# Patient Record
Sex: FEMALE | Race: WHITE | Employment: OTHER | URBAN - NONMETROPOLITAN AREA
[De-identification: names, ages, dates, MRNs, and addresses within clinical notes are randomized per-mention and may not be internally consistent; named-entity substitution may affect disease eponyms.]

---

## 2020-11-09 ENCOUNTER — HOSPITAL ENCOUNTER (INPATIENT)
Age: 85
LOS: 2 days | Discharge: HOME HEALTH | DRG: 064 | End: 2020-11-11
Attending: HOSPITALIST | Admitting: FAMILY MEDICINE
Payer: MEDICARE

## 2020-11-09 PROBLEM — I69.319 CVA, OLD, COGNITIVE DEFICITS: Status: ACTIVE | Noted: 2020-11-09

## 2020-11-09 PROBLEM — F02.80 ALZHEIMER'S DISEASE (HCC): Status: ACTIVE | Noted: 2020-11-09

## 2020-11-09 PROBLEM — Z79.01 CHRONIC ANTICOAGULATION: Status: ACTIVE | Noted: 2020-11-09

## 2020-11-09 PROBLEM — I62.9 INTRACRANIAL HEMORRHAGE (HCC): Status: ACTIVE | Noted: 2020-11-09

## 2020-11-09 PROBLEM — G30.9 ALZHEIMER'S DISEASE (HCC): Status: ACTIVE | Noted: 2020-11-09

## 2020-11-09 PROBLEM — R29.90 STROKE-LIKE SYMPTOMS: Status: ACTIVE | Noted: 2020-11-09

## 2020-11-09 LAB
ANION GAP SERPL CALCULATED.3IONS-SCNC: 12 MMOL/L (ref 7–19)
BACTERIA: NEGATIVE /HPF
BASOPHILS ABSOLUTE: 0.1 K/UL (ref 0–0.2)
BASOPHILS RELATIVE PERCENT: 0.6 % (ref 0–1)
BILIRUBIN URINE: NEGATIVE
BLOOD, URINE: ABNORMAL
BUN BLDV-MCNC: 17 MG/DL (ref 8–23)
CALCIUM SERPL-MCNC: 9.5 MG/DL (ref 8.8–10.2)
CHLORIDE BLD-SCNC: 100 MMOL/L (ref 98–111)
CLARITY: CLEAR
CO2: 25 MMOL/L (ref 22–29)
COLOR: YELLOW
CREAT SERPL-MCNC: 1.4 MG/DL (ref 0.5–0.9)
CRYSTALS, UA: ABNORMAL /HPF
EOSINOPHILS ABSOLUTE: 0.1 K/UL (ref 0–0.6)
EOSINOPHILS RELATIVE PERCENT: 0.4 % (ref 0–5)
EPITHELIAL CELLS, UA: 3 /HPF (ref 0–5)
GFR AFRICAN AMERICAN: 43
GFR NON-AFRICAN AMERICAN: 36
GLUCOSE BLD-MCNC: 114 MG/DL (ref 74–109)
GLUCOSE URINE: NEGATIVE MG/DL
HCT VFR BLD CALC: 40 % (ref 37–47)
HEMOGLOBIN: 12.9 G/DL (ref 12–16)
HYALINE CASTS: 1 /HPF (ref 0–8)
IMMATURE GRANULOCYTES #: 0.1 K/UL
KETONES, URINE: ABNORMAL MG/DL
LEUKOCYTE ESTERASE, URINE: NEGATIVE
LYMPHOCYTES ABSOLUTE: 1.8 K/UL (ref 1.1–4.5)
LYMPHOCYTES RELATIVE PERCENT: 15.9 % (ref 20–40)
MAGNESIUM: 2 MG/DL (ref 1.6–2.4)
MCH RBC QN AUTO: 27.2 PG (ref 27–31)
MCHC RBC AUTO-ENTMCNC: 32.3 G/DL (ref 33–37)
MCV RBC AUTO: 84.4 FL (ref 81–99)
MONOCYTES ABSOLUTE: 1 K/UL (ref 0–0.9)
MONOCYTES RELATIVE PERCENT: 8.7 % (ref 0–10)
NEUTROPHILS ABSOLUTE: 8.5 K/UL (ref 1.5–7.5)
NEUTROPHILS RELATIVE PERCENT: 73.9 % (ref 50–65)
NITRITE, URINE: NEGATIVE
PDW BLD-RTO: 14.4 % (ref 11.5–14.5)
PH UA: 5 (ref 5–8)
PHOSPHORUS: 2.1 MG/DL (ref 2.5–4.5)
PLATELET # BLD: 225 K/UL (ref 130–400)
PMV BLD AUTO: 11.7 FL (ref 9.4–12.3)
POTASSIUM SERPL-SCNC: 3.4 MMOL/L (ref 3.5–5)
PROTEIN UA: NEGATIVE MG/DL
RBC # BLD: 4.74 M/UL (ref 4.2–5.4)
RBC UA: 13 /HPF (ref 0–4)
SODIUM BLD-SCNC: 137 MMOL/L (ref 136–145)
SPECIFIC GRAVITY UA: 1.02 (ref 1–1.03)
TSH REFLEX FT4: 2.57 UIU/ML (ref 0.35–5.5)
UROBILINOGEN, URINE: 1 E.U./DL
VITAMIN B-12: 364 PG/ML (ref 211–946)
WBC # BLD: 11.5 K/UL (ref 4.8–10.8)
WBC UA: 1 /HPF (ref 0–5)

## 2020-11-09 PROCEDURE — 6360000002 HC RX W HCPCS: Performed by: HOSPITALIST

## 2020-11-09 PROCEDURE — 2580000003 HC RX 258: Performed by: HOSPITALIST

## 2020-11-09 PROCEDURE — 83735 ASSAY OF MAGNESIUM: CPT

## 2020-11-09 PROCEDURE — 1210000000 HC MED SURG R&B

## 2020-11-09 PROCEDURE — 80048 BASIC METABOLIC PNL TOTAL CA: CPT

## 2020-11-09 PROCEDURE — 85025 COMPLETE CBC W/AUTO DIFF WBC: CPT

## 2020-11-09 PROCEDURE — 84100 ASSAY OF PHOSPHORUS: CPT

## 2020-11-09 PROCEDURE — 99221 1ST HOSP IP/OBS SF/LOW 40: CPT | Performed by: NEUROLOGICAL SURGERY

## 2020-11-09 PROCEDURE — 82607 VITAMIN B-12: CPT

## 2020-11-09 PROCEDURE — 6370000000 HC RX 637 (ALT 250 FOR IP): Performed by: HOSPITALIST

## 2020-11-09 PROCEDURE — 36415 COLL VENOUS BLD VENIPUNCTURE: CPT

## 2020-11-09 PROCEDURE — 84443 ASSAY THYROID STIM HORMONE: CPT

## 2020-11-09 PROCEDURE — 2500000003 HC RX 250 WO HCPCS: Performed by: HOSPITALIST

## 2020-11-09 PROCEDURE — 81001 URINALYSIS AUTO W/SCOPE: CPT

## 2020-11-09 RX ORDER — ACETAMINOPHEN 325 MG/1
650 TABLET ORAL EVERY 4 HOURS PRN
Status: DISCONTINUED | OUTPATIENT
Start: 2020-11-09 | End: 2020-11-11 | Stop reason: HOSPADM

## 2020-11-09 RX ORDER — ATORVASTATIN CALCIUM 40 MG/1
40 TABLET, FILM COATED ORAL NIGHTLY
Status: DISCONTINUED | OUTPATIENT
Start: 2020-11-09 | End: 2020-11-11 | Stop reason: HOSPADM

## 2020-11-09 RX ORDER — ACETAMINOPHEN 650 MG/1
650 SUPPOSITORY RECTAL EVERY 4 HOURS PRN
Status: DISCONTINUED | OUTPATIENT
Start: 2020-11-09 | End: 2020-11-11 | Stop reason: HOSPADM

## 2020-11-09 RX ORDER — PANTOPRAZOLE SODIUM 40 MG/1
40 TABLET, DELAYED RELEASE ORAL
Status: DISCONTINUED | OUTPATIENT
Start: 2020-11-10 | End: 2020-11-11 | Stop reason: HOSPADM

## 2020-11-09 RX ORDER — PHENOL 1.4 %
1 AEROSOL, SPRAY (ML) MUCOUS MEMBRANE 2 TIMES DAILY
Status: DISCONTINUED | OUTPATIENT
Start: 2020-11-09 | End: 2020-11-11 | Stop reason: HOSPADM

## 2020-11-09 RX ORDER — ONDANSETRON 2 MG/ML
4 INJECTION INTRAMUSCULAR; INTRAVENOUS EVERY 6 HOURS PRN
Status: DISCONTINUED | OUTPATIENT
Start: 2020-11-09 | End: 2020-11-11 | Stop reason: HOSPADM

## 2020-11-09 RX ORDER — SODIUM CHLORIDE 0.9 % (FLUSH) 0.9 %
10 SYRINGE (ML) INJECTION PRN
Status: DISCONTINUED | OUTPATIENT
Start: 2020-11-09 | End: 2020-11-11 | Stop reason: HOSPADM

## 2020-11-09 RX ORDER — SODIUM CHLORIDE 0.9 % (FLUSH) 0.9 %
10 SYRINGE (ML) INJECTION EVERY 12 HOURS SCHEDULED
Status: DISCONTINUED | OUTPATIENT
Start: 2020-11-09 | End: 2020-11-11 | Stop reason: HOSPADM

## 2020-11-09 RX ORDER — POLYETHYLENE GLYCOL 3350 17 G/17G
17 POWDER, FOR SOLUTION ORAL DAILY PRN
Status: DISCONTINUED | OUTPATIENT
Start: 2020-11-09 | End: 2020-11-11 | Stop reason: HOSPADM

## 2020-11-09 RX ORDER — PROMETHAZINE HYDROCHLORIDE 12.5 MG/1
12.5 TABLET ORAL EVERY 6 HOURS PRN
Status: DISCONTINUED | OUTPATIENT
Start: 2020-11-09 | End: 2020-11-11 | Stop reason: HOSPADM

## 2020-11-09 RX ORDER — LEVETIRACETAM 500 MG/1
500 TABLET ORAL 2 TIMES DAILY
Status: DISCONTINUED | OUTPATIENT
Start: 2020-11-09 | End: 2020-11-11 | Stop reason: HOSPADM

## 2020-11-09 RX ORDER — PROPRANOLOL HYDROCHLORIDE 20 MG/1
20 TABLET ORAL 2 TIMES DAILY
Status: DISCONTINUED | OUTPATIENT
Start: 2020-11-09 | End: 2020-11-11 | Stop reason: HOSPADM

## 2020-11-09 RX ORDER — POTASSIUM CHLORIDE 20 MEQ/1
20 TABLET, EXTENDED RELEASE ORAL 2 TIMES DAILY
Status: DISCONTINUED | OUTPATIENT
Start: 2020-11-09 | End: 2020-11-11 | Stop reason: HOSPADM

## 2020-11-09 RX ORDER — SODIUM CHLORIDE AND POTASSIUM CHLORIDE .9; .15 G/100ML; G/100ML
SOLUTION INTRAVENOUS CONTINUOUS
Status: DISPENSED | OUTPATIENT
Start: 2020-11-09 | End: 2020-11-10

## 2020-11-09 RX ADMIN — ACETAMINOPHEN 650 MG: 325 TABLET ORAL at 22:32

## 2020-11-09 RX ADMIN — CEFTRIAXONE 1 G: 1 INJECTION, POWDER, FOR SOLUTION INTRAMUSCULAR; INTRAVENOUS at 22:33

## 2020-11-09 RX ADMIN — Medication 600 MG: at 22:33

## 2020-11-09 RX ADMIN — POTASSIUM CHLORIDE 20 MEQ: 20 TABLET, EXTENDED RELEASE ORAL at 22:37

## 2020-11-09 RX ADMIN — POTASSIUM CHLORIDE AND SODIUM CHLORIDE: 900; 150 INJECTION, SOLUTION INTRAVENOUS at 22:52

## 2020-11-09 RX ADMIN — PROPRANOLOL HYDROCHLORIDE 20 MG: 20 TABLET ORAL at 22:32

## 2020-11-09 RX ADMIN — POTASSIUM PHOSPHATE, MONOBASIC AND POTASSIUM PHOSPHATE, DIBASIC 30 MMOL: 224; 236 INJECTION, SOLUTION, CONCENTRATE INTRAVENOUS at 23:57

## 2020-11-09 RX ADMIN — LEVETIRACETAM 500 MG: 500 TABLET ORAL at 22:33

## 2020-11-09 RX ADMIN — ATORVASTATIN CALCIUM 40 MG: 40 TABLET, FILM COATED ORAL at 22:33

## 2020-11-09 ASSESSMENT — PAIN SCALES - GENERAL
PAINLEVEL_OUTOF10: 2
PAINLEVEL_OUTOF10: 0

## 2020-11-10 ENCOUNTER — APPOINTMENT (OUTPATIENT)
Dept: MRI IMAGING | Age: 85
DRG: 064 | End: 2020-11-10
Attending: HOSPITALIST
Payer: MEDICARE

## 2020-11-10 ENCOUNTER — APPOINTMENT (OUTPATIENT)
Dept: CT IMAGING | Age: 85
DRG: 064 | End: 2020-11-10
Attending: HOSPITALIST
Payer: MEDICARE

## 2020-11-10 LAB
ANION GAP SERPL CALCULATED.3IONS-SCNC: 10 MMOL/L (ref 7–19)
BASOPHILS ABSOLUTE: 0.1 K/UL (ref 0–0.2)
BASOPHILS RELATIVE PERCENT: 0.8 % (ref 0–1)
BUN BLDV-MCNC: 17 MG/DL (ref 8–23)
CALCIUM SERPL-MCNC: 9.9 MG/DL (ref 8.8–10.2)
CHLORIDE BLD-SCNC: 99 MMOL/L (ref 98–111)
CHOLESTEROL, TOTAL: 167 MG/DL (ref 160–199)
CO2: 26 MMOL/L (ref 22–29)
CREAT SERPL-MCNC: 1.5 MG/DL (ref 0.5–0.9)
EOSINOPHILS ABSOLUTE: 0.1 K/UL (ref 0–0.6)
EOSINOPHILS RELATIVE PERCENT: 1.1 % (ref 0–5)
GFR AFRICAN AMERICAN: 40
GFR NON-AFRICAN AMERICAN: 33
GLUCOSE BLD-MCNC: 102 MG/DL (ref 74–109)
HBA1C MFR BLD: 5.8 % (ref 4–6)
HCT VFR BLD CALC: 38.3 % (ref 37–47)
HDLC SERPL-MCNC: 42 MG/DL (ref 65–121)
HEMOGLOBIN: 12.3 G/DL (ref 12–16)
IMMATURE GRANULOCYTES #: 0 K/UL
LDL CHOLESTEROL CALCULATED: 105 MG/DL
LV EF: 58 %
LVEF MODALITY: NORMAL
LYMPHOCYTES ABSOLUTE: 2 K/UL (ref 1.1–4.5)
LYMPHOCYTES RELATIVE PERCENT: 19 % (ref 20–40)
MCH RBC QN AUTO: 27 PG (ref 27–31)
MCHC RBC AUTO-ENTMCNC: 32.1 G/DL (ref 33–37)
MCV RBC AUTO: 84.2 FL (ref 81–99)
MONOCYTES ABSOLUTE: 1.1 K/UL (ref 0–0.9)
MONOCYTES RELATIVE PERCENT: 10.7 % (ref 0–10)
NEUTROPHILS ABSOLUTE: 7.2 K/UL (ref 1.5–7.5)
NEUTROPHILS RELATIVE PERCENT: 68 % (ref 50–65)
PDW BLD-RTO: 14.5 % (ref 11.5–14.5)
PHOSPHORUS: 5.1 MG/DL (ref 2.5–4.5)
PLATELET # BLD: 232 K/UL (ref 130–400)
PMV BLD AUTO: 11.5 FL (ref 9.4–12.3)
POTASSIUM SERPL-SCNC: 4.6 MMOL/L (ref 3.5–5)
RBC # BLD: 4.55 M/UL (ref 4.2–5.4)
SODIUM BLD-SCNC: 135 MMOL/L (ref 136–145)
TRIGL SERPL-MCNC: 101 MG/DL (ref 0–149)
WBC # BLD: 10.5 K/UL (ref 4.8–10.8)

## 2020-11-10 PROCEDURE — 70553 MRI BRAIN STEM W/O & W/DYE: CPT

## 2020-11-10 PROCEDURE — 84100 ASSAY OF PHOSPHORUS: CPT

## 2020-11-10 PROCEDURE — A9577 INJ MULTIHANCE: HCPCS | Performed by: EMERGENCY MEDICINE

## 2020-11-10 PROCEDURE — 99223 1ST HOSP IP/OBS HIGH 75: CPT | Performed by: PSYCHIATRY & NEUROLOGY

## 2020-11-10 PROCEDURE — 85025 COMPLETE CBC W/AUTO DIFF WBC: CPT

## 2020-11-10 PROCEDURE — 97535 SELF CARE MNGMENT TRAINING: CPT

## 2020-11-10 PROCEDURE — 95816 EEG AWAKE AND DROWSY: CPT

## 2020-11-10 PROCEDURE — 2580000003 HC RX 258: Performed by: HOSPITALIST

## 2020-11-10 PROCEDURE — 6360000002 HC RX W HCPCS: Performed by: HOSPITALIST

## 2020-11-10 PROCEDURE — C8929 TTE W OR WO FOL WCON,DOPPLER: HCPCS

## 2020-11-10 PROCEDURE — 6360000004 HC RX CONTRAST MEDICATION: Performed by: EMERGENCY MEDICINE

## 2020-11-10 PROCEDURE — 97116 GAIT TRAINING THERAPY: CPT

## 2020-11-10 PROCEDURE — 92523 SPEECH SOUND LANG COMPREHEN: CPT

## 2020-11-10 PROCEDURE — 99232 SBSQ HOSP IP/OBS MODERATE 35: CPT | Performed by: NEUROLOGICAL SURGERY

## 2020-11-10 PROCEDURE — 83036 HEMOGLOBIN GLYCOSYLATED A1C: CPT

## 2020-11-10 PROCEDURE — 97161 PT EVAL LOW COMPLEX 20 MIN: CPT

## 2020-11-10 PROCEDURE — 36415 COLL VENOUS BLD VENIPUNCTURE: CPT

## 2020-11-10 PROCEDURE — 70544 MR ANGIOGRAPHY HEAD W/O DYE: CPT

## 2020-11-10 PROCEDURE — 80048 BASIC METABOLIC PNL TOTAL CA: CPT

## 2020-11-10 PROCEDURE — 6370000000 HC RX 637 (ALT 250 FOR IP): Performed by: HOSPITALIST

## 2020-11-10 PROCEDURE — 92610 EVALUATE SWALLOWING FUNCTION: CPT

## 2020-11-10 PROCEDURE — 1210000000 HC MED SURG R&B

## 2020-11-10 PROCEDURE — 93880 EXTRACRANIAL BILAT STUDY: CPT

## 2020-11-10 PROCEDURE — 80061 LIPID PANEL: CPT

## 2020-11-10 PROCEDURE — 70450 CT HEAD/BRAIN W/O DYE: CPT

## 2020-11-10 PROCEDURE — 97166 OT EVAL MOD COMPLEX 45 MIN: CPT

## 2020-11-10 PROCEDURE — 6360000004 HC RX CONTRAST MEDICATION: Performed by: NURSE PRACTITIONER

## 2020-11-10 RX ADMIN — POTASSIUM CHLORIDE 20 MEQ: 20 TABLET, EXTENDED RELEASE ORAL at 12:49

## 2020-11-10 RX ADMIN — PROPRANOLOL HYDROCHLORIDE 20 MG: 20 TABLET ORAL at 12:50

## 2020-11-10 RX ADMIN — PROPRANOLOL HYDROCHLORIDE 20 MG: 20 TABLET ORAL at 20:43

## 2020-11-10 RX ADMIN — PERFLUTREN 1.65 MG: 6.52 INJECTION, SUSPENSION INTRAVENOUS at 08:10

## 2020-11-10 RX ADMIN — POTASSIUM CHLORIDE 20 MEQ: 20 TABLET, EXTENDED RELEASE ORAL at 20:43

## 2020-11-10 RX ADMIN — Medication 600 MG: at 20:43

## 2020-11-10 RX ADMIN — Medication 600 MG: at 12:48

## 2020-11-10 RX ADMIN — GADOBENATE DIMEGLUMINE 14 ML: 529 INJECTION, SOLUTION INTRAVENOUS at 11:38

## 2020-11-10 RX ADMIN — PANTOPRAZOLE SODIUM 40 MG: 40 TABLET, DELAYED RELEASE ORAL at 05:24

## 2020-11-10 RX ADMIN — CEFTRIAXONE 1 G: 1 INJECTION, POWDER, FOR SOLUTION INTRAMUSCULAR; INTRAVENOUS at 20:39

## 2020-11-10 RX ADMIN — ATORVASTATIN CALCIUM 40 MG: 40 TABLET, FILM COATED ORAL at 20:43

## 2020-11-10 RX ADMIN — LEVETIRACETAM 500 MG: 500 TABLET ORAL at 20:43

## 2020-11-10 RX ADMIN — PANTOPRAZOLE SODIUM 40 MG: 40 TABLET, DELAYED RELEASE ORAL at 17:26

## 2020-11-10 RX ADMIN — LEVETIRACETAM 500 MG: 500 TABLET ORAL at 12:48

## 2020-11-10 NOTE — CONSULTS
[facility] Neurology Consult  ? ? Patient: Valarie Patton  MR#: [id number]  Account Number: [de-identified]   Room: 46/36-18   YOB: 1933  Date of Progress Note: 11/10/2020  Time of Note 12:35 PM  Attending Physician: Edgard Mcdonald MD  Consulting Physician: Simon Andrade M.D.  ?  ? CHIEF COMPLAINT: Aphasia, possible new onset seizure and abnormal CT of the head. ? HISTORY OF PRESENT ILLNESS:   This 80 y.o. female who presents with aphasia/confusion, possible new onset seizure and abnormal CT scan of the head. According to the records, she was taken to an outside ED because she became aphasic. CT scan showed a hypodensity in the left parietal region as well as an old one in the left occipital region. Additionally, there is a small hyper dense lesion in the right frontal cortex suspicious for possible vascular lesion or bleed. Patient supposedly is on aspirin and Xarelto at home. Reportedly had some seizure-like activity at 1 point and was placed on Keppra. She is now stable and on the regular floor but still somewhat aphasic. I do not know her baseline. No family is in the room. CT scan reviewed. Today's MRI of the head reviewed. There is a subacute stroke in the left posterior MCA distribution. Possible tiny hemorrhage in the right frontal cortex. MRA results uncertain as there appears to be a good deal of artifact. Carotid Dopplers okay as is echocardiogram.  REVIEW OF SYSTEMS:  Constitutional - No fever or chills. No diaphoresis or significant fatigue. HENT - No tinnitus or significant hearing loss. Eyes - no sudden vision change or eye pain  Respiratory - no significant shortness of breath or cough  Cardiovascular - no chest pain No palpitations or significant leg swelling  Gastrointestinal - no abdominal swelling or pain. Genitourinary - No difficulty urinating, dysuria  Musculoskeletal - no back pain or myalgia.   Skin - no color change or rash  Neurologic - No headaches or abnormal movements. Hematologic - no easy bruising or excessive bleeding. Psychiatric - no severe anxiety or nervousness. All other review of systems are negative. ?   Past Medical History:      Diagnosis Date    Arthritis     [facility] [address] [address]     Fatigue     Fracture of distal end of radius     Hyperlipidemia     Hypertension     Osteoarthritis of knee     Osteoporosis     [facility]oxysmal A-fib ([facility] [address]     Stroke (cerebrum) ([facility] [address] 06/2015    left posterior cerebral artery CVA    Transient blindness     Transient cerebral ischemia      Past Surgical History:      Procedure Laterality Date    BACK SURGERY      CHOLECYSTECTOMY      PARTIAL HYSTERECTOMY      WRIST FRACTURE SURGERY      WRIST FRACTURE SURGERY Left     ORIF distal radius fracture     Medications in Hospital:     Current Facility-Administered Medications:     levETIRAcetam (KEPPRA) tablet 500 mg, 500 mg, Oral, BID, Michele Watts MD, 500 mg at 11/09/20 2233    sodium chloride flush 0.9 % injection 10 mL, 10 mL, Intravenous, 2 times per day, Michele Watts MD    sodium chloride flush 0.9 % injection 10 mL, 10 mL, Intravenous, PRN, Michele Watts MD    acetaminophen (TYLENOL) tablet 650 mg, 650 mg, Oral, Q4H PRN, 650 mg at 11/09/20 2232 **OR** acetaminophen (TYLENOL) suppository 650 mg, 650 mg, Rectal, Q4H PRN, Michele Watts MD    polyethylene glycol (GLYCOLAX) packet 17 g, 17 g, Oral, Daily PRN, Michele Watts MD    promethazine (PHENERGAN) tablet 12.5 mg, 12.5 mg, Oral, Q6H PRN **OR** ondansetron (ZOFRAN) injection 4 mg, 4 mg, Intravenous, Q6H PRN, Michele Watts MD    enoxaparin (LOVENOX) injection 40 mg, 40 mg, Subcutaneous, Daily, Michele Watts MD    calcium carbonate tablet 600 mg, 1 tablet, Oral, BID, Michele Watts MD, 600 mg at 11/09/20 2233    pantoprazole (PROTONIX) tablet 40 mg, 40 mg, Oral, BID AC, Michele Watts MD, 40 mg at 11/10/20 0524    potassium chloride (KLOR-CON M) extended release tablet 20 mEq, 20 mEq, Oral, BID, Michele Watts MD, 20 mEq at 11/09/20 2237    propranolol (INDERAL) tablet 20 mg, 20 mg, Oral, BID, Mihcele Watts MD, 20 mg at 11/09/20 2232    atorvastatin (LIPITOR) tablet 40 mg, 40 mg, Oral, Nightly, Michele Watts MD, 40 mg at 11/09/20 2233    cefTRIAXone (ROCEPHIN) 1 g in sterile water 10 mL IV syringe, 1 g, Intravenous, Q24H, Michele Watts MD, 1 g at 11/09/20 2233    0.9% NaCl with KCl 20 mEq infusion, , Intravenous, Continuous, Michele Watts MD, Last Rate: 75 mL/hr at 11/09/20 2252  Allergies: Alferd Edu [aspirin]  Social History:   TOBACCO:  reports that she has never smoked. She has never used smokeless tobacco.  ETOH:  reports no history of alcohol use. Family History:       Problem Relation Age of Onset    Stroke Maternal Grandmother      ? ? Physical Exam:    Vitals: /71   Pulse 63   Temp 97.5 °F (36.4 °C) (Temporal)   Resp 18   SpO2 94%   Constitutional - well developed, well nourished. Eyes - conjunctiva normal. Pupils react to light  Ear, nose, throat -hearing intact to finger rub No scars, masses, or lesions over external nose or ears, no atrophy of tongue  Neck-symmetric, no masses noted, no jugular vein distension  Respiration- chest wall appears symmetric, good expansion,   normal effort without use of accessory muscles  Musculoskeletal - no significant wasting of muscles noted, no bony deformities  Extremities-no clubbing, cyanosis or edema  Skin - warm, dry, and intact. No rash, erythema, or pallor. Psychiatric - mood, affect, and behavior appear normal.   Neurological exam  Awake, alert, fluent oriented to hospital.  She has word finding difficulties and cannot come up with the name of it. She had trouble naming object parts. Cannot follow complex commands. Had some difficulty with simple ones.   Cranial Nerve Exam   CN II- Visual fields grossly unremarkable but difficult to assess due to aphasia  CN III, IV,VI-EOMI, No nystagmus, conjugate eye movements, no ptosis  CN V-sensation intact to LT over on hold. Defer to neurosurgery.       Frances Fitzpatrick MD  Board Certified in Neurology

## 2020-11-10 NOTE — PROGRESS NOTES
[facility]  Consult Note    INTERVAL HISTORY:  No new events overnight. Repeat CT head this AM reveals improvement in the right frontal hemorrhage. MRI brain consistent with small hemorrhage. No clear vascular lesion or tumor on MRI or MRA - some artifact noted. Subacute/acute stroke noted in left parietal area. Seen by neurology team.      Patient is pleasant. She denies any new complains. No HA. Somewhat confused but overall appropriate. CHIEF COMPLAINT:  AMS, difficult speaking    HISTORY OF PRESENT ILLNESS:      The patient is a 80 y.o. female with Hx of previous stroke on ASA and Xarelto who presented to [facility] ED with an acute onset of AMS and aphasia. Family stated patient was having trouble speaking and appeared confused. CT head revealed an old left occipital stroke but a new hyperdensity over right frontal lobe concerning for either hemorrhage or vascular lesion. Just prior to leaving patient was noted to have episode of stiffing and involuntary movements suggestive of a seizure. Patient was stabilized, given keppra 1000 mg and transferred here.         Past Medical History:   Diagnosis Date    Arthritis     Atrial fibrillation ([address]     Fatigue     Fracture of distal end of radius     Hyperlipidemia     Hypertension     Osteoarthritis of knee     Osteoporosis     Paroxysmal A-fib ([address]     Stroke (cerebrum) ([address] 06/2015    left posterior cerebral artery CVA    Transient blindness     Transient cerebral ischemia        Past Surgical History:   Procedure Laterality Date    BACK SURGERY      CHOLECYSTECTOMY      PARTIAL HYSTERECTOMY      WRIST FRACTURE SURGERY      WRIST FRACTURE SURGERY Left     ORIF distal radius fracture        Medications    Current Facility-Administered Medications:     perflutren lipid microspheres (DEFINITY) injection 1.65 mg, 1.5 mL, Intravenous, ONCE PRN, LEXY Sibley, 1.65 mg at 11/10/20 0810    [START ON 11/11/2020] enoxaparin (LOVENOX) injection 30 mg, 30 mg, Subcutaneous, Daily, Michele Watts MD    levETIRAcetam (KEPPRA) tablet 500 mg, 500 mg, Oral, BID, Michele Watts MD, 500 mg at 11/10/20 1248    sodium chloride flush 0.9 % injection 10 mL, 10 mL, Intravenous, 2 times per day, Michele Watts MD    sodium chloride flush 0.9 % injection 10 mL, 10 mL, Intravenous, PRN, Michele Watts MD    acetaminophen (TYLENOL) tablet 650 mg, 650 mg, Oral, Q4H PRN, 650 mg at 11/09/20 2232 **OR** acetaminophen (TYLENOL) suppository 650 mg, 650 mg, Rectal, Q4H PRN, Michele Watts MD    polyethylene glycol (GLYCOLAX) packet 17 g, 17 g, Oral, Daily PRN, Michele Watts MD    promethazine (PHENERGAN) tablet 12.5 mg, 12.5 mg, Oral, Q6H PRN **OR** ondansetron (ZOFRAN) injection 4 mg, 4 mg, Intravenous, Q6H PRN, Michele Watts MD    calcium carbonate tablet 600 mg, 1 tablet, Oral, BID, Michele Watts MD, 600 mg at 11/10/20 1248    pantoprazole (PROTONIX) tablet 40 mg, 40 mg, Oral, BID AC, Michele Watts MD, 40 mg at 11/10/20 0524    potassium chloride (KLOR-CON M) extended release tablet 20 mEq, 20 mEq, Oral, BID, Michele Watts MD, 20 mEq at 11/10/20 1249    propranolol (INDERAL) tablet 20 mg, 20 mg, Oral, BID, Michele Watts MD, 20 mg at 11/10/20 1250    atorvastatin (LIPITOR) tablet 40 mg, 40 mg, Oral, Nightly, Michele Watts MD, 40 mg at 11/09/20 2233    cefTRIAXone (ROCEPHIN) 1 g in sterile water 10 mL IV syringe, 1 g, Intravenous, Q24H, Michele Watts MD, 1 g at 11/09/20 2233    0.9% NaCl with KCl 20 mEq infusion, , Intravenous, Continuous, Michele Watts MD, Last Rate: 75 mL/hr at 11/09/20 4771  Asa [aspirin]    Social History  Social History     Tobacco Use   Smoking Status Never Smoker   Smokeless Tobacco Never Used     Social History     Substance and Sexual Activity   Alcohol Use No    Alcohol/week: 0.0 standard drinks         Family History   Problem Relation Age of Onset    Stroke Maternal Grandmother          REVIEW OF SYSTEMS:  Constitutional: No fevers No chills  Neck:No stiffness  Respiratory: No shortness of breath  Cardiovascular: No chest pain No palpitations  Gastrointestinal: No abdominal pain    Genitourinary: No Dysuria  Neurological: No headache, Mild confusion    PHYSICAL EXAM:  Vitals:    11/10/20 1410   BP: (!) 155/63   Pulse: 56   Resp: 18   Temp: 97.2 °F (36.2 °C)   SpO2: 94%       Constitutional: The patient appears well-developed and well-nourished. Eyes - conjunctiva normal.  Conjugate Gaze  Ear, nose, throat - No scars, masses, or lesions over external nose or ears, no atrophy of tongue  Neck-symmetric, no masses noted, no jugular vein distension  Respiration- chest wall appears symmetric, good expansion, normal effort without use of accessory muscles  Musculoskeletal - no significant wasting of muscles noted, no bony deformities  Extremities-no clubbing, cyanosis or edema  Skin - warm, dry, and intact. No rash, erythema, or pallor. Psychiatric - mood, affect, and behavior appear normal.     Neurologic Examination  Awake, Alert and oriented x 2, unable to recall president, or the correct year  CN II-XII intact   Normal speech pattern, following commands  Motor 5/5 all extremities, no pronator drift  No deficits to light touch   Reflexes are 2+ and symmetric  Babinski sign was downgoing bilaterally  No clonus or Hoffmans sign      DATA:  Nursing/pcp notes, imaging,labs and vitals reviewed.      PT,OT and/or speech notes reviewed    Lab Results   Component Value Date    WBC 10.5 11/10/2020    HGB 12.3 11/10/2020    HCT 38.3 11/10/2020    MCV 84.2 11/10/2020     11/10/2020     Lab Results   Component Value Date     (L) 11/10/2020    K 4.6 11/10/2020    CL 99 11/10/2020    CO2 26 11/10/2020    BUN 17 11/10/2020    CREATININE 1.5 (H) 11/10/2020    GLUCOSE 102 11/10/2020    CALCIUM 9.9 11/10/2020    LABGLOM 33 (A) 11/10/2020    GFRAA 40 (L) 11/10/2020   No results found for: INR, PROTIME    CT head [place] 11/9/2020  Small spherical hyperdensity over the frontal lobe that does not produce any mass effect     EXAMINATION: [address]  11/10/2020 12:21 PM    HISTORY: Stroke symptoms    COMPARISON: MR brain performed today    TECHNIQUE: MR angiography brain was performed using 3-D time-of-flight    technique. 2-D axial source images were reviewed. FINDINGS:    The distal vertebral arteries and basilar artery are intact without    significant steno-occlusive changes. In the distal siphon portion of the right internal carotid artery    there is eccentric narrowing may be artifact. Significant    steno-occlusive changes difficult to exclude. There is decreased signal identified in the distal    cavernous/supraclinoid left internal carotid artery which may be    artifact. Significant steno-occlusive changes again not excluded. The distal left middle cerebral artery branches particularly along the    sylvian fissure poorly visualized relative to the contralateral side. Steno-occlusive changes not excluded. The anterior cerebral artery branches are intact. The posterior cerebral artery branches are also poorly visualized    proximally with areas of decreased signal and steno-occlusive changes    not excluded. There is no MR evidence of cerebral aneurysm. CT angiography head may be considered to clarify.         Impression    1. Multifocal areas of decreased signal in the distal internal carotid    arteries, middle and posterior cerebral artery branches as described    above which may be artifactual. Significant steno-occlusive changes    not excluded. Signed by Dr Chris Olguin on 11/10/2020 12:29 PM      EXAMINATION: [address] [facility] [facility]  11/10/2020 11:59 AM    HISTORY: History of stroke    COMPARISON: Head CT performed today    TECHNIQUE: Multiplanar MR imaging the brain was performed. Gadolinium    enhanced contrast imaging obtained.     FINDINGS:    There is diffusion signal abnormality compatible with acute/subacute    ischemic change involving the left posterior    temporal/occipital/parietal lobe region, watershed vascular    distribution with corresponding T2 signal hyperintensity changes and    intravascular and into parenchymal enhancing gadolinium enhancement. In the right frontal lobe, corresponding to the 5 mm high attenuation    focus on CT, there is focal area of FLAIR signal hyperintensity and    blooming artifact gradient echo imaging. A small amount of    intraparenchymal hemorrhage considered. No corresponding abnormal    enhancement observed. No additional diffusion signal abnormalities observed. There is no significant mass effect or midline shift. There is no    hydrocephalus    Minimal chronic ischemic white matter changes suspected.         Impression    1. Acute/subacute left temporal/parietal/occipital lobe infarct with    associated teeth 2 signal changes and gallium enhancement. 2. Small high density focus in the right frontal lobe anteriorly    observed on today's CT examination demonstrates increased FLAIR signal    hyperintensity and blooming artifact worrisome for small amount of    acute hemorrhagic change. Signed by Dr Cinthya Avina on 11/10/2020 12:21 PM          IMPRESSION  80year old female with hx of previous left cerebral stroke on ASA and xarelto with acute change in mental status, difficulty speaking and possible seizure like activity with a small sperical right frontal lesion noted on CT head    RECOMMENDATIONS:    No emergent neurosurgical intervention warranted  CT head this AM stable/improving, will continue to follow with serial CTs, continue to hold anticoagulation for now  MRI brain revealed acute/subacute stroke. This is likely the result of her acute changes in mental status/aphasia  Continue Keppra for now, unclear if had seizure. Will discuss with neurology team further.       Ingrid Rosales DO

## 2020-11-10 NOTE — PROGRESS NOTES
Pharmacy Renal Adjustment    Ilene Lomeli is a 80 y.o. female. Pharmacy has renally adjusted medications per protocol.     Recent Labs     11/09/20  2117 11/10/20  0307   BUN 17 17       Recent Labs     11/09/20  2117 11/10/20  0307   CREATININE 1.4* 1.5*       CrCl calculated to be 24 ml/min    Height:   Ht Readings from Last 1 Encounters:   03/23/16 5' 3\" (1.6 m)     Weight:  Wt Readings from Last 1 Encounters:   03/23/16 150 lb (68 kg)       Plan: Adjust the following medications based on renal function:           Lovenox 40 mg SC daily to 30 mg SC daily    Electronically signed by Shahid Irizarry, [address] on 11/10/2020 at 4:11 PM

## 2020-11-10 NOTE — CONSULTS
[place] [facility]  Consult Note    CHIEF COMPLAINT:  AMS, difficult speaking    HISTORY OF PRESENT ILLNESS:      The patient is a 80 y.o. female with Hx of previous stroke on ASA and Xarelto who presented to [facility] ED with an acute onset of AMS and aphasia. Family stated patient was having trouble speaking and appeared confused. CT head revealed an old left parietal stroke but a new hyperdensity over right frontal lobe concerning for either hemorrhage or vascular lesion. Just prior to leaving patient was noted to have episode of stiffing and involuntary movements suggestive of a seizure. Patient was stabilized, given keppra 1000 mg and transferred here. Currently, she is awake with a normal speech pattern. She denies any HA or N/V. Past Medical History:   Diagnosis Date    Arthritis     Atrial fibrillation ([address]     Fatigue     Fracture of distal end of radius     Hyperlipidemia     Hypertension     Osteoarthritis of knee     Osteoporosis     Paroxysmal A-fib ([address]     Stroke (cerebrum) ([facility] [address] 06/2015    left posterior cerebral artery CVA    Transient blindness     Transient cerebral ischemia        Past Surgical History:   Procedure Laterality Date    BACK SURGERY      CHOLECYSTECTOMY      PARTIAL HYSTERECTOMY      WRIST FRACTURE SURGERY      WRIST FRACTURE SURGERY Left     ORIF distal radius fracture        Medications  No current facility-administered medications for this encounter.    Asa [aspirin]    Social History  Social History     Tobacco Use   Smoking Status Never Smoker   Smokeless Tobacco Never Used     Social History     Substance and Sexual Activity   Alcohol Use No    Alcohol/week: 0.0 standard drinks         Family History   Problem Relation Age of Onset    Stroke Maternal Grandmother          REVIEW OF SYSTEMS:  Constitutional: No fevers No chills  Neck:No stiffness  Respiratory: No shortness of breath  Cardiovascular: No chest pain No palpitations  Gastrointestinal: No abdominal pain    Genitourinary: No Dysuria  Neurological: No headache, Mild confusion    PHYSICAL EXAM:  There were no vitals filed for this visit. Constitutional: The patient appears well-developed and well-nourished. Eyes - conjunctiva normal.  Conjugate Gaze  Ear, nose, throat - No scars, masses, or lesions over external nose or ears, no atrophy of tongue  Neck-symmetric, no masses noted, no jugular vein distension  Respiration- chest wall appears symmetric, good expansion, normal effort without use of accessory muscles  Musculoskeletal - no significant wasting of muscles noted, no bony deformities  Extremities-no clubbing, cyanosis or edema  Skin - warm, dry, and intact. No rash, erythema, or pallor. Psychiatric - mood, affect, and behavior appear normal.     Neurologic Examination  Awake, Alert and oriented x 3, unable to recall president  CN II-XII intact   Normal speech pattern, following commands  Motor 5/5 all extremities, no pronator drift  No deficits to light touch   Reflexes are 2+ and symmetric  Babinski sign was downgoing bilaterally  No clonus or Hoffmans sign        DATA:  Nursing/pcp notes, imaging,labs and vitals reviewed.      PT,OT and/or speech notes reviewed    Lab Results   Component Value Date    WBC 11.14 (H) 07/10/2015    HGB 13.3 07/10/2015    HCT 41.8 07/10/2015    MCV 83.4 07/10/2015     07/10/2015     Lab Results   Component Value Date     07/11/2015    K 3.4 (L) 07/11/2015     07/11/2015    CO2 27 07/11/2015    BUN 17 07/11/2015    CREATININE 0.7 07/11/2015    GLUCOSE 105 07/11/2015    CALCIUM 8.9 07/11/2015    LABGLOM 85 07/11/2015   No results found for: INR, PROTIME    CT head [facility]. 11/9/2020  Small spherical hyperdensity over the frontal lobe that does not produce any mass effect     IMPRESSION  80year old female with hx of previous left cerebral stroke on ASA and xarelto with acute change in mental status, difficulty speaking and possible seizure like activity with a small sperical right frontal lesion noted on CT head    RECOMMENDATIONS:    No emergent neurosurgical intervention warranted  Will repeat CT head in AM  Continue to hold anticoagulation  Keppra 500 BID  MRI brain without contrast  Consider neurology consult        Tawana Lock DO

## 2020-11-10 NOTE — PROGRESS NOTES
Occupational Therapy   Occupational Therapy Initial Assessment  Date: 11/10/2020   Patient Name: Kate Pyle  MRN: [id number]     : 1933    Date of Service: 11/10/2020    Discharge Recommendations:  Patient would benefit from continued therapy after discharge       Assessment   Assessment: Evaluation completed and tx initiated. Primary deficit area is with expressive communication but would benefit from further assessment and supervision while completing more complex ADL and IADL tasks. Treatment Diagnosis: Intracranial hemorrhage  REQUIRES OT FOLLOW UP: Yes  Activity Tolerance  Activity Tolerance: Patient Tolerated treatment well  Safety Devices  Safety Devices in place: Yes  Type of devices: Call light within reach; Chair alarm in place           Patient Diagnosis(es): There were no encounter diagnoses. has a past medical history of Arthritis, Atrial fibrillation ([address] Fatigue, Fracture of distal end of radius, Hyperlipidemia, Hypertension, Osteoarthritis of knee, Osteoporosis, Paroxysmal A-fib ([address] Stroke (cerebrum) ([address] Transient blindness, and Transient cerebral ischemia. has a past surgical history that includes back surgery; Cholecystectomy; Wrist fracture surgery; Wrist fracture surgery (Left); and partial hysterectomy (cervix not removed).     Treatment Diagnosis: Intracranial hemorrhage      Restrictions  Restrictions/Precautions  Restrictions/Precautions: Fall Risk    Subjective   General  Chart Reviewed: Yes  Patient assessed for rehabilitation services?: Yes  Family / Caregiver Present: No  Patient Currently in Pain: No  Vital Signs  Temp: 97.2 °F (36.2 °C)  Temp Source: Temporal  Pulse: 56  Heart Rate Source: Monitor  Resp: 18  BP: (!) 155/63  BP Location: Right Arm  BP Upper/Lower: Upper  Patient Currently in Pain: No  Oxygen Therapy  SpO2: 94 %  O2 Device: None (Room air)  Social/Functional History  Social/Functional History  Additional Comments: Limited communication skills due to expressive aphasia, but seems to state that she lives with her son who works and that she was independent at home       Objective              Balance  Sitting Balance: Independent  Standing Balance: Supervision  Toilet Transfers  Toilet - Technique: Stand step  Toilet Transfer: Independent;Supervision  ADL  Feeding: Independent  Grooming: Independent;Supervision  UE Bathing: Supervision  LE Bathing: Supervision  UE Dressing: Independent;Supervision  LE Dressing: Independent;Supervision  Toileting: Independent;Supervision  Coordination  Movements Are Fluid And Coordinated: Yes     Bed mobility  Supine to Sit: Independent  Transfers  Stand Step Transfers: Supervision; Independent  Vision - Basic Assessment  Visual Field Cut: No  Oculo Motor Control:  WNL  Cognition  Cognition Comment: Follows 5/5 simple verbal commands with no context clues, significant expressive aphasia, observed simple independent problem solving functionally                 LUE PROM (degrees)  LUE PROM: WFL  LUE AROM (degrees)  LUE AROM : WFL  RUE PROM (degrees)  RUE PROM: WFL  RUE AROM (degrees)  RUE AROM : WFL                    Tx initiated:  toileting (10 mins)    Plan   Plan  Times per week: 3-5    G-Code     OutComes Score                                                  AM-PAC Score             Goals  Short term goals  Short term goal 1: Independent with ambulatory home management  Short term goal 2: Patient and family will verbalize home recommendations       Therapy Time   Individual Concurrent Group Co-treatment   Time In           Time Out           Minutes                   Froylan Lao OT Electronically signed by Froylan Lao OT on 11/10/2020 at 2:19 PM

## 2020-11-10 NOTE — PROGRESS NOTES
Physical Therapy    Facility/Department: [facility] SURG SERVICES  Initial Assessment    NAME: Julienne Woodson  : 1933  MRN: [id number]    Date of Service: 11/10/2020    Discharge Recommendations:  Continue to assess pending progress, Home with assist PRN(ANTICIPATE pt TO BE ABLE TO D/C HOME BUT ADVISE TO HAVE SOMEONE PRESENT THE FIRST 24-48 HRS TO ENSURE SAFE TRANSTION TO HOME)   PT Equipment Recommendations  Equipment Needed: No    Assessment   Body structures, Functions, Activity limitations: Decreased functional mobility ; Decreased endurance  Assessment: pt APPEARS TO BE CLOSE TO BASELINE FOR MOBILITY BUT DID NOTE  MILD DIFFICULTY WITH R/L AND \"SQUARING UP\" TO RECLINER. Prognosis: Good  Decision Making: Low Complexity  PT Education: Gait Training;General Safety; Functional Mobility Training;Transfer Training  REQUIRES PT FOLLOW UP: Yes  Activity Tolerance  Activity Tolerance: Patient Tolerated treatment well;Patient limited by endurance       Patient Diagnosis(es): There were no encounter diagnoses. has a past medical history of Arthritis, Atrial fibrillation ([address] Fatigue, Fracture of distal end of radius, Hyperlipidemia, Hypertension, Osteoarthritis of knee, Osteoporosis, Paroxysmal A-fib ([address] Stroke (cerebrum) ([address] Transient blindness, and Transient cerebral ischemia. has a past surgical history that includes back surgery; Cholecystectomy; Wrist fracture surgery; Wrist fracture surgery (Left); and partial hysterectomy (cervix not removed). Restrictions  Restrictions/Precautions  Restrictions/Precautions: Fall Risk  Vision/Hearing        Subjective  General  Patient assessed for rehabilitation services?: Yes  Diagnosis: CVA  Follows Commands: Within Functional Limits  General Comment  Comments: NOTED EXPRESSIVE APHASIA  Subjective  Subjective: pt STATES SHE IS READY TO AMBULATE IN THE HALLWAY WITH A RW. STATES SHE FEELS LIKE SHE WILL BE ABLE TO D/C BACK HOME WITH SON'S SUPPORT.   Pain Screening  Patient Currently in Pain: Denies  Vital Signs  Patient Currently in Pain: Denies       Orientation     Social/Functional History  Social/Functional History  Lives With: Son  Home Equipment: Rolling walker  Ambulation Assistance: Independent  Transfer Assistance: Independent  Additional Comments: Limited communication skills due to expressive aphasia, but seems to state that she lives with her son who works and that she was independent at home  Cognition        Objective          AROM RLE (degrees)  RLE AROM: WFL  AROM LLE (degrees)  LLE AROM : WFL  Strength RLE  Strength RLE: WFL  Strength LLE  Strength LLE: WFL  Tone RLE  RLE Tone: Normotonic  Tone LLE  LLE Tone: Normotonic  Motor Control  Gross Motor?: WFL        Transfers  Sit to Stand: Stand by assistance;Contact guard assistance  Stand to sit: Stand by assistance;Contact guard assistance(pt DID REQUIRE VC TO \"CENTER\" AT CHAIR PRIOR TO SITTING)  Ambulation  Ambulation?: Yes  Ambulation 1  Surface: level tile  Device: Rolling Walker  Assistance: Contact guard assistance  Quality of Gait: SLOW. DID NOTE pt TO HAVE MILD CONFUSION WITH RIGHT AND LEFT DIRECTIONS.  NO LOB  Distance: 80 FT  Comments: MILDLY FATIGUED              Plan   Plan  Times per week: 5-7  Plan weeks: 2  Current Treatment Recommendations: Strengthening, Safety Education & Training, Balance Training, Functional Mobility Training, Transfer Training, Gait Training, Pain Management, Positioning  Safety Devices  Type of devices: Call light within reach, Chair alarm in place, Gait belt, Left in chair    G-Code       OutComes Score                                                  AM-PAC Score             Goals  Short term goals  Time Frame for Short term goals: 2 WKS  Short term goal 1:  FT WITH RW, SUPERVISION       Therapy Time   Individual Concurrent Group Co-treatment   Time In           Time Out           Minutes                   Edwina Shepherd PT    Electronically signed by Edwina Shepherd PT on 11/10/2020 at 2:46 PM

## 2020-11-10 NOTE — PROGRESS NOTES
Speech Language Pathology  Facility/Department: [facility] SURG SERVICES   CLINICAL BEDSIDE SWALLOW EVALUATION and SPEECH LANGUAGE COGNITIVE EVALUATION    NAME: Savita Oleary  : 1933  MRN: [id number]   Date of Eval: 11/10/2020  Evaluating Therapist: Ignacio Sherman MS CCC-SLP  ADMISSION DATE: 2020     ADMITTING DIAGNOSIS:   has Paroxysmal A-fib ([address] Hypertension; Hyperlipidemia; Stroke (cerebrum) ([address] Intracranial hemorrhage ([address] Stroke-like symptoms; Chronic anticoagulation; CVA, old, cognitive deficits; and Alzheimer's disease ([address] on their problem list.    HISTORY OF PRESENT ILLNESS:   Per Neurology: This 80 y.o. female who presents with aphasia/confusion, possible new onset seizure and abnormal CT scan of the head. According to the records, she was taken to an outside ED because she became aphasic. CT scan reviewed. Today's MRI of the head reviewed. There is a subacute stroke in the left posterior MCA distribution. Possible tiny hemorrhage in the right frontal cortex. MRA results uncertain as there appears to be a good deal of artifact. Carotid Dopplers okay as is       Current Diet level:  Current Diet : NPO  Current Liquid Diet : NPO    Pain:  Pain Assessment  Pain Level: 0  Response to Pain Intervention: Asleep with RR greater than 10, Symptomatic drop in B/P    Reason for Referral  Savita Oleary was referred for a bedside swallow evaluation to assess the efficiency of her swallow function, identify signs and symptoms of aspiration and make recommendations regarding safe dietary consistencies, effective compensatory strategies, and safe eating environment. Impression  Oral and pharyngeal phases of the swallow are within functional limits. No overt s/s of aspiration observed. She denied any decreased oral and facial sensation. Recommend she begin a dysphagia 1 diet, puree with thin liquids. Recommend meds be crushed in applesauce.  Patient exhibits significant expressive aphasia and some degree of receptive aphasia. Patient would benefit from continued speech therapy services following discharge. Dysphagia Diagnosis: Swallow function appears grossly intact    Dysphagia Outcome Severity Scale: Level 6: Within functional limits/Modified independence     Treatment Plan  Requires SLP Intervention: Yes  Duration/Frequency of Treatment: 1x/day, 3-5x/week  D/C Recommendations: To be determined       Recommended Diet and Intervention  Diet Solids Recommendation: Dysphagia Pureed (Dysphagia I)  Liquid Consistency Recommendation: Thin  Recommended Form of Meds: Crushed in puree as able     Therapeutic Interventions: Oral care;Diet tolerance monitoring;Patient/Family education; Therapeutic PO trials with SLP    Compensatory Swallowing Strategies  Compensatory Swallowing Strategies: Upright as possible for all oral intake;Remain upright for 30-45 minutes after meals; Check for pocketing of food on the Left; Check for pocketing of food on the Right;Small bites/sips; Alternate solids and liquids    Treatment/Goals  Short-term Goals  Goal 1: The patient will tolerate a dysphagia 1 diet, puree with thin liquids with minimal overt s/s of aspiration. Goal 2: Staff/caregivers will complete daily oral hygiene to prevent aspiration of oral bacteria. Goal 3: Pt will name objects/pictures with minimal cues at 70% accuracy. Goal 4: Pt will complete automatic speech tasks with minimal cues at 70% accuracy. Goal 5: Pt will follow simple one and two step commands with minimal cues at 70% accuracy. General  Chart Reviewed: Yes  Behavior/Cognition: Alert; Cooperative  Temperature Spikes Noted: No  Respiratory Status: Room air  O2 Device: None (Room air)  Communication Observation: Aphasia  Follows Directions: (Difficulty following directions)  Dentition: Dentures top  Patient Positioning: Upright in bed  Baseline Vocal Quality: Normal  Volitional Cough: Strong  Prior Dysphagia History: Unable to obtain.  No history of MBSS in Epic.  Consistencies Administered: Reg solid; Dysphagia Pureed (Dysphagia I); Thin - cup; Thin - straw        Oral Motor Deficits  Oral/Motor  Oral Motor: (Pt denied decreased oral and facial sensation. Upper denture in place.)    Oral Phase Dysfunction  Oral Phase  Oral Phase - Comment: Oral phase of the swallow was within functional limits. No labial spillage observed. No significant oral reisdue or buccal cavity pocketing noted. No suspected premature loss of the bolus over the tongue base. Indicators of Pharyngeal Phase Dysfunction   Pharyngeal Phase   Pharyngeal: Pharyngeal phase of the swallow is within functional limits. No overt s/s of aspiration observed with thin liquids, puree or solids. Good hyolaryngeal elevation. Timely swallow responses. She was able to tolerate the 3oz water swallow test.    Prognosis  Good    Speech/Language/Cognitive Evaluation:  Neologisms and semantic paraphasias noted. Naming objects at 10%, demographic information tasks at 10%, orientation tasks at 0%, following one step commands at 30%. Answering simple yes/no questions reliably at 90% this date. Answering simple open ended questions at 10% Automatic speech tasks such as days of the week and months of the year proved too challenging. She was able to count from 1-10 in unison and with visual aid. No dysarthria observed. Voicing was [place]. Recommend speech therapy services following discharge. Education  Patient Education: Education provided regarding diet recommendations and swallowing precautions.   Patient Education Response: Verbalizes understanding       11/10/2020 2:50 PM      Electronically Signed By:  Ruel Montelongo  11/10/2020,2:57 PM.

## 2020-11-10 NOTE — PROGRESS NOTES
Patient off the floor for procedure. Will return at a later time for evaluation.           Electronically Signed By:  Aparna Blum M.S., CCC-SLP  11/10/2020,10:20 AM.

## 2020-11-10 NOTE — H&P
[place], [place], [address]        DEPARTMENT OF HOSPITALIST MEDICINE        HISTORY & PHYSICAL:          REASON FOR ADMISSION:  Patient transferred from ER for evaluation by neurosurgery after patient was diagnosed with a small intracranial hemorrhage. HISTORY OF PRESENT ILLNESS:  Ilene Lomeli is an 80 y.o. female. She is a very pleasant lady who lives alone at home. She has a history of prior stroke and is taking aspirin and Xarelto. She was brought to the ER for evaluation with complaints of altered mental status and difficulty finding words. Family stated the patient had trouble speaking and appeared confused. CAT scan was done which showed old left parietal stroke but a new hypodensity over right frontal lobe concerning for either hemorrhage or vascular lesion. She also had UTI on evaluation in the ER. ER physician spoke with me and wanted to transfer the patient to be evaluated by neurosurgeon. I accepted the transfer and evaluated the patient at the bedside when she came. She is feeling better, her aphasia has improved, but she is still pleasantly confused which appears to be her baseline. She was evaluated by neurosurgery who ordered further work-up and spoke with me about the case in detail. I also started her on IV Rocephin after obtaining urine cultures for UTI. We will admit the patient for medical management, neurovascular checks, PT/OT/ST evaluation as per TIA/stroke protocol, evaluation by neurosurgery for her intracranial hemorrhage and neurology for strokelike symptoms and to follow-up on their further recommendations.     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    left posterior cerebral artery CVA    Transient blindness     Transient cerebral ischemia          PAST SURGICAL HISTORY:  Past Surgical History:   Procedure Laterality Date    BACK SURGERY      CHOLECYSTECTOMY      PARTIAL HYSTERECTOMY      WRIST FRACTURE SURGERY      WRIST FRACTURE SURGERY Left     ORIF distal radius fracture        SOCIAL HISTORY:  Social History     Socioeconomic History    Marital status:      Spouse name: None    Number of children: None    Years of education: None    Highest education level: None   Occupational History    None   Social Needs    Financial resource strain: None    Food insecurity     Worry: None     Inability: None    Transportation needs     Medical: None     Non-medical: None   Tobacco Use    Smoking status: Never Smoker    Smokeless tobacco: Never Used   Substance and Sexual Activity    Alcohol use: No     Alcohol/week: 0.0 standard drinks    Drug use: No    Sexual activity: None   Lifestyle    Physical activity     Days per week: None     Minutes per session: None    Stress: None   Relationships    Social connections     Talks on phone: None     Gets together: None     Attends Rastafari service: None     Active member of club or organization: None     Attends meetings of clubs or organizations: None     Relationship status: None    Intimate partner violence     Fear of current or ex partner: None     Emotionally abused: None     Physically abused: None     Forced sexual activity: None   Other Topics Concern    None   Social History Narrative    None        FAMILY HISTORY:  Family History   Problem Relation Age of Onset    Stroke Maternal Grandmother          ALLERGIES:  Allergies   Allergen Reactions    Asa [Aspirin] Swelling     Swelling in leg        PRIOR TO ADMISSION MEDS:  Medications Prior to Admission: aspirin 81 MG tablet, Take 81 mg by mouth daily  calcium carbonate 600 MG TABS tablet, Take 1 tablet by mouth 2 times daily  potassium chloride SA (K-DUR;KLOR-CON M) 20 MEQ tablet, last 72 hours. Cardiac Enzymes: No results for input(s): Chioma Gaffney in the last 72 hours. ABGs:No results found for: PHART, PO2ART, MKC7CLI  Urinalysis:  Lab Results   Component Value Date    NITRU NEGATIVE 07/09/2015    WBCUA 6 07/09/2015    BACTERIA NEGATIVE 07/09/2015    RBCUA 3 07/09/2015    GLUCOSEU NEGATIVE 07/09/2015     A1C: No results for input(s): LABA1C in the last 72 hours. ABG:No results for input(s): PHART, IDE4VWM, PO2ART, FPX3MLI, BEART, HGBAE, J2VRQFAK, CARBOXHGBART in the last 72 hours. EKG:   Please see chart      IMAGING:  No results found. Assessment and Plan:    Principal Problem:    Intracranial hemorrhage (HCC)  Active Problems:    Stroke-like symptoms    Paroxysmal A-fib (HCC)    Hypertension    Hyperlipidemia    Chronic anticoagulation    CVA, old, cognitive deficits    Alzheimer's disease ([facility]  Resolved Problems:    * No resolved hospital problems. *         Admit patient to medical unit under full admission status  Keep patient NPO except meds  Hold off on aspirin and Xarelto  Continuous cardiac telemetry monitoring  2-D echo ordered to evaluate cardiac structure and function  Check TSH and vitamin B12 levels  Bilateral carotid artery duplex ordered  MRI/MRA of the brain for the radiologist added by neurosurgery to follow-up on intracranial hemorrhage in a.m. Neurovascular checks ordered every 4 hours as per TIA/stroke protocol  PT/ST/OT evaluation ordered as per TIA/stroke protocol  Neurology consult in a.m. for evaluation and further treatment recommendations  DC planning once cleared by neurology and neurosurgery  Urinalysis ordered to evaluate for UTI  IV Rocephin started for treatment of UTI  Hold off on diuretics   Patient may need to be started on gentle IV hydration after lab review    Repeat labs in a.m. Electrolyte replacement as per protocol. Patient will be monitored very closely on the floor. Further recommendations as per the hospital course.

## 2020-11-10 NOTE — PROGRESS NOTES
Vascular Preliminary Report      Bilateral Carotid Artery Duplex Exam Completed    Predicted Degree of Stenosis:    RT ICA: <50%                       LT ICA: <50%    Bilateral vertebral arteries appear antegrade. Final Report Pending.

## 2020-11-11 VITALS
TEMPERATURE: 97.2 F | DIASTOLIC BLOOD PRESSURE: 64 MMHG | RESPIRATION RATE: 16 BRPM | OXYGEN SATURATION: 92 % | SYSTOLIC BLOOD PRESSURE: 163 MMHG | HEART RATE: 66 BPM

## 2020-11-11 LAB
ANION GAP SERPL CALCULATED.3IONS-SCNC: 11 MMOL/L (ref 7–19)
BASOPHILS ABSOLUTE: 0.1 K/UL (ref 0–0.2)
BASOPHILS RELATIVE PERCENT: 0.7 % (ref 0–1)
BUN BLDV-MCNC: 14 MG/DL (ref 8–23)
CALCIUM SERPL-MCNC: 9.7 MG/DL (ref 8.8–10.2)
CHLORIDE BLD-SCNC: 104 MMOL/L (ref 98–111)
CO2: 24 MMOL/L (ref 22–29)
CREAT SERPL-MCNC: 1.3 MG/DL (ref 0.5–0.9)
EOSINOPHILS ABSOLUTE: 0.3 K/UL (ref 0–0.6)
EOSINOPHILS RELATIVE PERCENT: 2.9 % (ref 0–5)
GFR AFRICAN AMERICAN: 47
GFR NON-AFRICAN AMERICAN: 39
GLUCOSE BLD-MCNC: 90 MG/DL (ref 74–109)
HCT VFR BLD CALC: 40.1 % (ref 37–47)
HEMOGLOBIN: 12.7 G/DL (ref 12–16)
IMMATURE GRANULOCYTES #: 0.1 K/UL
LYMPHOCYTES ABSOLUTE: 2 K/UL (ref 1.1–4.5)
LYMPHOCYTES RELATIVE PERCENT: 20.2 % (ref 20–40)
MCH RBC QN AUTO: 27.2 PG (ref 27–31)
MCHC RBC AUTO-ENTMCNC: 31.7 G/DL (ref 33–37)
MCV RBC AUTO: 85.9 FL (ref 81–99)
MONOCYTES ABSOLUTE: 0.8 K/UL (ref 0–0.9)
MONOCYTES RELATIVE PERCENT: 8.1 % (ref 0–10)
NEUTROPHILS ABSOLUTE: 6.8 K/UL (ref 1.5–7.5)
NEUTROPHILS RELATIVE PERCENT: 67.6 % (ref 50–65)
PDW BLD-RTO: 14.6 % (ref 11.5–14.5)
PLATELET # BLD: 229 K/UL (ref 130–400)
PMV BLD AUTO: 11.6 FL (ref 9.4–12.3)
POTASSIUM SERPL-SCNC: 4.8 MMOL/L (ref 3.5–5)
RBC # BLD: 4.67 M/UL (ref 4.2–5.4)
SODIUM BLD-SCNC: 139 MMOL/L (ref 136–145)
WBC # BLD: 10.1 K/UL (ref 4.8–10.8)

## 2020-11-11 PROCEDURE — 6370000000 HC RX 637 (ALT 250 FOR IP): Performed by: HOSPITALIST

## 2020-11-11 PROCEDURE — 2580000003 HC RX 258: Performed by: HOSPITALIST

## 2020-11-11 PROCEDURE — 99233 SBSQ HOSP IP/OBS HIGH 50: CPT | Performed by: PSYCHIATRY & NEUROLOGY

## 2020-11-11 PROCEDURE — 92507 TX SP LANG VOICE COMM INDIV: CPT

## 2020-11-11 PROCEDURE — 97530 THERAPEUTIC ACTIVITIES: CPT

## 2020-11-11 PROCEDURE — 80048 BASIC METABOLIC PNL TOTAL CA: CPT

## 2020-11-11 PROCEDURE — 85025 COMPLETE CBC W/AUTO DIFF WBC: CPT

## 2020-11-11 PROCEDURE — 99231 SBSQ HOSP IP/OBS SF/LOW 25: CPT | Performed by: NEUROLOGICAL SURGERY

## 2020-11-11 PROCEDURE — 92526 ORAL FUNCTION THERAPY: CPT

## 2020-11-11 PROCEDURE — 36415 COLL VENOUS BLD VENIPUNCTURE: CPT

## 2020-11-11 PROCEDURE — 97116 GAIT TRAINING THERAPY: CPT

## 2020-11-11 PROCEDURE — 97535 SELF CARE MNGMENT TRAINING: CPT

## 2020-11-11 RX ORDER — LEVETIRACETAM 500 MG/1
500 TABLET ORAL 2 TIMES DAILY
Qty: 60 TABLET | Refills: 0 | Status: SHIPPED | OUTPATIENT
Start: 2020-11-11 | End: 2021-03-11

## 2020-11-11 RX ORDER — ATORVASTATIN CALCIUM 40 MG/1
40 TABLET, FILM COATED ORAL NIGHTLY
Qty: 30 TABLET | Refills: 0 | Status: SHIPPED | OUTPATIENT
Start: 2020-11-11 | End: 2022-03-04

## 2020-11-11 RX ADMIN — PROPRANOLOL HYDROCHLORIDE 20 MG: 20 TABLET ORAL at 07:21

## 2020-11-11 RX ADMIN — LEVETIRACETAM 500 MG: 500 TABLET ORAL at 07:21

## 2020-11-11 RX ADMIN — PANTOPRAZOLE SODIUM 40 MG: 40 TABLET, DELAYED RELEASE ORAL at 06:30

## 2020-11-11 RX ADMIN — Medication 600 MG: at 07:20

## 2020-11-11 RX ADMIN — POTASSIUM CHLORIDE 20 MEQ: 20 TABLET, EXTENDED RELEASE ORAL at 07:21

## 2020-11-11 RX ADMIN — SODIUM CHLORIDE, PRESERVATIVE FREE 10 ML: 5 INJECTION INTRAVENOUS at 07:22

## 2020-11-11 NOTE — PROGRESS NOTES
Occupational Therapy  Facility/Department: [facility] SURG SERVICES  Daily Treatment Note  NAME: Nirmala Munoz  : 1933  MRN: [id number]    Date of Service: 2020    Discharge Recommendations:  Patient would benefit from continued therapy after discharge       Assessment   Assessment: Pt tolerated tx well. Pt got up to go to bathroom, used toilet and completed grooming at sink. Pt continues to have some confusion and is aware of confusion. Pt continues to benefit from therapy . Treatment Diagnosis: Intracranial hemorrhage  REQUIRES OT FOLLOW UP: Yes  Activity Tolerance  Activity Tolerance: Patient Tolerated treatment well         Patient Diagnosis(es): There were no encounter diagnoses. has a past medical history of Arthritis, Atrial fibrillation ([address] Fatigue, Fracture of distal end of radius, Hyperlipidemia, Hypertension, Osteoarthritis of knee, Osteoporosis, Paroxysmal A-fib ([address] Stroke (cerebrum) ([address] Transient blindness, and Transient cerebral ischemia. has a past surgical history that includes back surgery; Cholecystectomy; Wrist fracture surgery; Wrist fracture surgery (Left); and partial hysterectomy (cervix not removed).     Restrictions  Restrictions/Precautions  Restrictions/Precautions: Fall Risk  Subjective   General  Chart Reviewed: Yes  Patient assessed for rehabilitation services?: Yes  Response to previous treatment: Patient with no complaints from previous session  Family / Caregiver Present: No  Vital Signs  Patient Currently in Pain: No   Orientation     Objective    ADL  Grooming: Supervision;Setup  Toileting: Supervision        Balance  Sitting Balance: Independent  Standing Balance: Supervision  Toilet Transfers  Toilet - Technique: Stand step  Equipment Used: Grab bars  Toilet Transfer: Supervision     Transfers  Stand Step Transfers: Supervision  Sit to stand: Supervision  Stand to sit: Supervision                                                                 Plan Plan  Times per week: 3-5  G-Code     OutComes Score                                                  AM-PAC Score             Goals  Short term goals  Short term goal 1: Independent with ambulatory home management  Short term goal 2: Patient and family will verbalize home recommendations       Therapy Time   Individual Concurrent Group Co-treatment   Time In           Time Out           Minutes                   SUZAN Gudino

## 2020-11-11 NOTE — PROGRESS NOTES
RRR  Musculoskeletal - no significant wasting of muscles noted, no bony deformities, gait no gross ataxia  Extremities-no clubbing, cyanosis or edema  Skin - warm, dry, and intact. No rash, erythema, or pallor. Psychiatric - mood, affect, and behavior appear normal.      Neurology  NEUROLOGICAL EXAM:      Mental status   Awake, alert, fluent. Has difficulty naming objects and following complex greater than simple commands. Cranial Nerves   CN II- Visual fields grossly unremarkable  CN III, IV,VI-EOMI, No nystagmus, conjugate eye movements, no ptosis  CN VII-no facial asymmetry  CN VIII-Hearing intact   CN IX and X- Palate elevates in midline  CN XI-good shoulder shrug  CN XII-Tongue midline with no fasciculations or fibrillations          Motor function  Antigravity x 4     Sensory function Intact to light touch     Cerebellar F-N intact     Tremor None present     Gait                  Not tested           Nursing/pcp notes, imaging,labs and vitals reviewed.      PT,OT and/or speech notes reviewed    Lab Results   Component Value Date    WBC 10.1 2020    HGB 12.7 2020    HCT 40.1 2020    MCV 85.9 2020     2020     Lab Results   Component Value Date     2020    K 4.8 2020     2020    CO2 24 2020    BUN 14 2020    CREATININE 1.3 (H) 2020    GLUCOSE 90 2020    CALCIUM 9.7 2020    LABGLOM 39 (A) 2020    GFRAA 47 (L) 2020   No results found for: INR  Lab Results   Component Value Date    CRP 0.40 2015                             ELECTROENCEPHALOGRAM REPORT     PATIENT NAME: Marielos Chowdhury                         :        1933   MED REC NO:   [id number]                              ROOM:       [facility]   ACCOUNT NO:   [de-identified]                           ADMIT DATE: 2020   PROVIDER:     Fiorella Foster MD     DATE OF EE/10/2020     SUMMARY:  The background consists of an occasional 8 Hz activity, but   mostly 6 to 7 Hz activity.  Frequent artifact is noted from an unclear   source.  Occasional left-sided slowing is noted.  No clear epileptiform   activity is noted. Impression:        Diffuse awake and drowsy appearing EEG with some focal   slowing noted on the left.  Correlate clinically.  No epileptiform   activity noted. RECORD REVIEW: Previous medical records, medications were reviewed at today's visit    IMPRESSION:   1. Subacute left parietal stroke. This is the likely cause of her aphasia upon admission. Her previous stroke was in the left occipital area. Both of those are suspicious for embolic events. It would be important to find out if she was actually compliant with her Xarelto and/or aspirin. The patient's aphasia would prohibit me from getting a reliable answer, in my opinion. Both of those are currently on hold because of the small bleed in the right frontal region. Neurosurgery has seen her for this. Carotid Dopplers and echo otherwise unremarkable. Recommend prolonged cardiac monitoring at discharge to help rule out paroxysmal atrial fibrillation. Will need outpatient speech therapy. Too high functioning for inpatient rehab. Eric Flynn ? 2.  Possible new onset seizure. Circumstances vague and not documented in our system. Continue Keppra for now. EEG relatively unremarkable except for some slowing. May reduce or discontinue Keppra in the future. ?3.  Small right frontal bleed of uncertain consequence. Blood thinners currently on hold. Defer to neurosurgery. Okay with neurology to go home when okay with others if she has family to stay with.   Follow-up with me in the office 1 month  Case discussed with neurosurgery  More than 35 minutes spent reviewing the patient's records, films, counseling and coordination of care

## 2020-11-11 NOTE — CARE COORDINATION
Shelley Nieto    Date / Time of Evaluation: 11/11/2020 11:01 AM  Assessment Completed by: Juan Manuel Adair    Patient Admission Status: Inpatient [101]      Current PCP:  Gabbie Kincaid MD    Initial Assessment Completed at bedside with:  patient    Emergency Contacts:  Extended Emergency Contact Information  Primary Emergency Contact: [address] of [address] Phone: [phone]  Mobile Phone: [phone]  Relation: Spouse    Advance Directives: Code Status:  Full Code    Financial:  Payor: Rich Cooks / Plan: MEDICARE PART A AND B / Product Type: *No Product type* /     Pre-Cert required for SNF:  No    Pharmacy:   [address] [place],[address]  Phone: [phone] Fax: [phone]      Potential assistance purchasing medications? No    ADLS:  Support System:  Children    Current Home Environment:  Home w/son  Steps:  Yes    Plans to RETURN to current housing: Yes  Barriers to RETURNING to current housing:  No    Currently ACTIVE with Home Health CARE:  No  [address] [place] [address]:  N/A    DME Provider:  N/A    Had HOME OXYGEN prior to admission:  No  Oxygen Company:  N/A  Informed of need to bring portable home O2 tank to hospital on day of DISCHARGE:    Name of person committed to bringing portable tank at discharge: Active with HD/PD prior to admission: No  Nephrologist:  N/A  HD Center:  N/A    Transition Plan:  home-family support-hh services    Transportation PLAN for Discharge:  son    Factors facilitating achievement of predicted outcomes: home with family support and hh services    Barriers to discharge:  none      Additional CM/SW Notes: Jim Moya and/or her family were provided with choice of provider.         [facility] Management Department  Ph:    Fax: [phone]

## 2020-11-11 NOTE — DISCHARGE SUMMARY
Louie Gitelman  :  1933  MRN:  [id number]    Admit date:  2020  Discharge date:      Admitting Physician:  Amirah Muñiz DO    Advance Directive: Full Code    Consults: neurology and neurosurgery    Primary Care Physician:  Roberta Taylor MD    Discharge Diagnoses:  Principal Problem:    Intracranial hemorrhage [facility])  Active Problems:    Paroxysmal A-fib ([facility] [address]    Hypertension    Hyperlipidemia    Stroke-like symptoms    Chronic anticoagulation    CVA, old, cognitive deficits    Alzheimer's disease ([facility] [address]  Resolved Problems:    * No resolved hospital problems. *      Significant Diagnostic Studies:   Ct Head Wo Contrast    Result Date: 11/10/2020  CT HEAD WO CONTRAST 11/10/2020 7:45 AM History: Intracranial hemorrhage Comparisons: Brain MR dated 2015 Technique: Radiation dose equals  mGy cm. AUTOMATED EXPOSURE CONTROL dose reduction technique was implemented CT evaluation of the head without intravenous contrast. 5 mm transaxial images were obtained. 2-D sagittal and coronal reconstruction images were generated. Findings: In the right frontal lobe anteriorly and superiorly there is a small subcortical hyperdensity measuring 5 mm greatest dimension. Small area of parenchymal hemorrhage could have this appearance. There is no associated mass effect or midline shift. There is old left posterior parietal/occipital lobe infarct. There is no mass effect or midline shift. There is no hydrocephalus. Paranasal sinuses imaged in part are clear. There is no skull fracture acute calvarial abnormality. Impression: 1. Small 5 mm focus of high attenuation in the right frontal lobe superiorly and anteriorly, differential considerations include a small amount of hemorrhage. 2. Old left posterior parietal/occipital lobe infarcts.                   Signed by Dr Netta Ferrara on 11/10/2020 8:58 AM    Mra Head Wo Contrast    Result Date: 11/10/2020  EXAMINATION:  MRA HEAD WO CONTRAST  11/10/2020 12:21 PM Study:Stroke like symptoms. Risk Factors   - The patient's risk factor(s) include: arterial hypertension. Impression   There is smooth plaque visualized in the bilateral internal carotid  artery(ies). There is no stenosis in the right internal carotid artery. There is no stenosis of the left internal carotid artery. There is normal antegrade flow in the bilateral vertebral artery(ies). Compared to the previous study dated 7-9-15 , there is no significant  change in stenosis of the bilateral internal carotid artery(ies). Signature   ----------------------------------------------------------------  Electronically signed by Carleen Pallas MD(Interpreting  physician) on 11/10/2020 12:01 PM  ----------------------------------------------------------------  Blood Pressure:Right arm 138/64 mmHg. Velocities are measured in cm/s ; Diameters are measured in mm Carotid Right Measurements +------------+-------+-------+--------+-------+------------+---------------+ ! Location    ! PSV    ! EDV    ! Angle   ! RI     !%Stenosis   ! Tortuosity     ! +------------+-------+-------+--------+-------+------------+---------------+ ! Prox CCA    !66.8   !10.6   ! 60      !0.84   !            !               ! +------------+-------+-------+--------+-------+------------+---------------+ ! Mid CCA     !78     !12.3   !60      !0.84   !            !               ! +------------+-------+-------+--------+-------+------------+---------------+ ! Prox ICA    ! 79.7   !11.1   ! 60      !0.86   !            !               ! +------------+-------+-------+--------+-------+------------+---------------+ ! Mid ICA     !60.4   !14.1   ! 60      !0.77   !            !               ! +------------+-------+-------+--------+-------+------------+---------------+ ! Dist ICA    !65.1   !12.3   !60      !0.81   !            !               ! +------------+-------+-------+--------+-------+------------+---------------+ ! Prox ECA    !60.4   !       !60      !       ! !               ! +------------+-------+-------+--------+-------+------------+---------------+ ! Vertebral   !55.1   !9.97   !60      !0.82   !            !               ! +------------+-------+-------+--------+-------+------------+---------------+   - There is antegrade vertebral flow noted on the right side. - Additional Measurements:ICAPSV/CCAPSV 1.19. ICAEDV/CCAEDV 1.33. Carotid Left Measurements +------------+-------+-------+--------+-------+------------+---------------+ ! Location    ! PSV    ! EDV    ! Angle   ! RI     !%Stenosis   ! Tortuosity     ! +------------+-------+-------+--------+-------+------------+---------------+ ! Prox CCA    !73.9   !9.38   !60      !0.87   !            !               ! +------------+-------+-------+--------+-------+------------+---------------+ ! Mid CCA     !91.5   !10.6   ! 60      !0.88   !            !               ! +------------+-------+-------+--------+-------+------------+---------------+ ! Prox ICA    ! 64.5   !14.1   ! 60      !0.78   !            !               ! +------------+-------+-------+--------+-------+------------+---------------+ ! Mid ICA     ! 85     !18.8   !60      !0.78   !            !               ! +------------+-------+-------+--------+-------+------------+---------------+ ! Dist ICA    !66.8   !20.5   !60      !0.69   !            !               ! +------------+-------+-------+--------+-------+------------+---------------+ ! Prox ECA    !73.3   !       !60      !       !            !               ! +------------+-------+-------+--------+-------+------------+---------------+ ! Vertebral   !53.8   !13     !60      !0.76   !            !               ! +------------+-------+-------+--------+-------+------------+---------------+   - There is antegrade vertebral flow noted on the left side. - Additional Measurements:ICAPSV/CCAPSV 1.15. ICAEDV/CCAEDV 2.19.     Mri Brain W Wo Contrast    Result Date: 11/10/2020  EXAMINATION:  MRI BRAIN W WO CONTRAST 11/10/2020 11:59 AM HISTORY: History of stroke COMPARISON: Head CT performed today TECHNIQUE: Multiplanar MR imaging the brain was performed. Gadolinium enhanced contrast imaging obtained. FINDINGS: There is diffusion signal abnormality compatible with acute/subacute ischemic change involving the left posterior temporal/occipital/parietal lobe region, watershed vascular distribution with corresponding T2 signal hyperintensity changes and intravascular and into parenchymal enhancing gadolinium enhancement. In the right frontal lobe, corresponding to the 5 mm high attenuation focus on CT, there is focal area of FLAIR signal hyperintensity and blooming artifact gradient echo imaging. A small amount of intraparenchymal hemorrhage considered. No corresponding abnormal enhancement observed. No additional diffusion signal abnormalities observed. There is no significant mass effect or midline shift. There is no hydrocephalus Minimal chronic ischemic white matter changes suspected. 1. Acute/subacute left temporal/parietal/occipital lobe infarct with associated teeth 2 signal changes and gallium enhancement. 2. Small high density focus in the right frontal lobe anteriorly observed on today's CT examination demonstrates increased FLAIR signal hyperintensity and blooming artifact worrisome for small amount of acute hemorrhagic change. Signed by Dr Denise Matias on 11/10/2020 12:21 PM      Pertinent Labs:   CBC:   Recent Labs     11/09/20  2117 11/10/20  0307 11/11/20  0308   WBC 11.5* 10.5 10.1   HGB 12.9 12.3 12.7    232 229     BMP:    Recent Labs     11/09/20  2117 11/10/20  0307 11/11/20  0308    135* 139   K 3.4* 4.6 4.8    99 104   CO2 25 26 24   BUN 17 17 14   CREATININE 1.4* 1.5* 1.3*   GLUCOSE 114* 102 90     INR: No results for input(s): INR in the last 72 hours. ABGs:No results for input(s): PH, PO2, PCO2, HCO3, BE, O2SAT in the last 72 hours.   Lactic Acid:No results for input(s): LACTA in the last 72 hours. Procedures: None performed. Hospital Course:   [copied from previous provider]  11/9/2020  Horace Colon an 80 y. o. female.  She is a very pleasant lady who lives alone at home.  She has a history of prior stroke and is taking aspirin and Mindy Shone brought to the ER for evaluation with complaints of altered mental status and difficulty finding words.  Family stated the patient had trouble speaking and appeared confused.  CAT scan was done which showed old left parietal stroke but a new hypodensity over right frontal lobe concerning for either hemorrhage or vascular lesion.  She also had UTI on evaluation in the ER. [facility] and wanted to transfer the patient to be evaluated by neurosurgeon. Darylene Silos accepted the transfer and evaluated the patient at the bedside when she came. María Santiago is feeling better, her aphasia has improved, but she is still pleasantly confused which appears to be her baseline.  She was evaluated by neurosurgery who ordered further work-up and spoke with me about the case in detail.  I also started her on IV Rocephin after obtaining urine cultures for UTI.  We will admit the patient for medical management, neurovascular checks, PT/OT/ST evaluation as per TIA/stroke protocol, evaluation by neurosurgery for her intracranial hemorrhage and neurology for strokelike symptoms and to follow-up on their further recommendations. 11/10/2020  Patient underwent extensive work-up from neurology and neurosurgery standpoint including MRI/MRI of brain, carotid arterial duplex. Both neurology and neurosurgery believe that patient had an element of acute stroke which is responsible for her mental status changes. The small focus of intracranial hemorrhage is stable. Patient is off of her anticoagulation at this time. He has been started on Keppra for seizure prevention.   [end copied section]  11-11: Cleared by neurology and neurosurgery for discharge to home and feeling well. Will need repeat CT in 2-3 days per neurosurgery with follow up as outpatient. Will order home speech therapy. Physical Exam:  Vitals: BP (!) 163/64   Pulse 66   Temp 97.2 °F (36.2 °C) (Temporal)   Resp 16   SpO2 92%   24HR INTAKE/OUTPUT:      Intake/Output Summary (Last 24 hours) at 11/11/2020 1251  Last data filed at 11/11/2020 0931  Gross per 24 hour   Intake 1405 ml   Output 850 ml   Net 555 ml     General appearance: alert and cooperative with exam  HEENT: atraumatic, eyes with clear conjunctiva and normal lids, pupils and irises normal, external ears and nose are normal, lips normal. Neck without masses, lympadenopathy, bruit, thyroid normal  Lungs: no increased work of breathing, diminished breath sounds bilaterally  Heart: regular rate and rhythm, S1, S2 normal, no murmur, click, rub or gallop  Abdomen: soft, non-tender; bowel sounds normal; no masses,  no organomegaly  Extremities: extremities normal without clubbing, atraumatic, no cyanosis or edema  Neurologic: no focal neurologic deficits, normal sensation, alert and oriented, affect and mood appropriate  Skin: no jaundice, rashes, or nodules    Discharge Medications: Ed Richter   Home Medication Instructions [id number]    Printed on:11/11/20 1251   Medication Information                      atorvastatin (LIPITOR) 40 MG tablet  Take 1 tablet by mouth nightly             calcium carbonate 600 MG TABS tablet  Take 1 tablet by mouth 2 times daily             hydrochlorothiazide (HYDRODIURIL) 25 MG tablet  Take 25 mg by mouth daily             lansoprazole (PREVACID) 30 MG capsule  Take 30 mg by mouth daily             levETIRAcetam (KEPPRA) 500 MG tablet  Take 1 tablet by mouth 2 times daily             potassium chloride SA (K-DUR;KLOR-CON M) 20 MEQ tablet  Take 20 mEq by mouth 2 times daily             propranolol (INDERAL) 20 MG tablet    Take 20 mg by mouth 2 times daily                  Discharge Instructions:    Follow up with Sherry Cannon MD in 3 days. Take medications as directed. Resume activity as tolerated. Diet: DIET DYSPHAGIA MINCED AND MOIST;     Disposition: Patient is medically stable and will be discharged Home. Time spent on discharge less than 30 minutes.     Signed:  Isauro Basilio DO

## 2020-11-11 NOTE — PROCEDURES
TAERAHEEM DAMON [facility] [address] [facility]                          ELECTROENCEPHALOGRAM REPORT    PATIENT NAME: Ruddy Ceron                         :        1933  MED REC NO:   [id number]                              ROOM:       [facility]  ACCOUNT NO:   [de-identified]                           ADMIT DATE: 2020  PROVIDER:     Delfina Lee MD    DATE OF EE/10/2020    SUMMARY:  The background consists of an occasional 8 Hz activity, but  mostly 6 to 7 Hz activity. Frequent artifact is noted from an unclear  source. Occasional left-sided slowing is noted. No clear epileptiform  activity is noted. IMPRESSION:  Diffuse awake and drowsy appearing EEG with some focal  slowing noted on the left. Correlate clinically. No epileptiform  activity noted.         Edward Fang MD    D: 2020 7:41:22      T: 2020 7:49:04     [email or web address]  Job#: [id number]     Doc#: [id number]    CC:

## 2020-11-11 NOTE — CARE COORDINATION
[facility] [address] [facility]  Notification of Admission Decision      [] Patient has been accepted for admit to [facility] on :       Please write discharge orders and summary prior to discharge. [] Patient acceptance to Rehab pending the following :    [] Eval in progress       [x] Patient determined to be ineligible for services at [facility] because : patient too high level with PT/OT, main need is SPT. We recommend you consider:         Thank you for your referral, we appreciate you.   Electronically Signed by Peter Gomez, Admissions Coordinator 11/11/2020 9:06 AM

## 2020-11-11 NOTE — PROGRESS NOTES
[facility]  Progress Note    INTERVAL HISTORY:  No new events overnight. Resting comfortably this morning, remains confused and aphasic. She has no new complaints, denies HA.      CHIEF COMPLAINT:  AMS, difficult speaking    HISTORY OF PRESENT ILLNESS:      The patient is a 80 y.o. female with Hx of previous stroke on ASA and Xarelto who presented to [facility] ED with an acute onset of AMS and aphasia. Family stated patient was having trouble speaking and appeared confused. CT head revealed an old left occipital stroke but a new hyperdensity over right frontal lobe concerning for either hemorrhage or vascular lesion. Just prior to leaving patient was noted to have episode of stiffing and involuntary movements suggestive of a seizure. Patient was stabilized, given keppra 1000 mg and transferred here.         Past Medical History:   Diagnosis Date    Arthritis     Atrial fibrillation ([address]     Fatigue     Fracture of distal end of radius     Hyperlipidemia     Hypertension     Osteoarthritis of knee     Osteoporosis     Paroxysmal A-fib (HCC)     [facility]brum[facility]/2015    left posterior cerebral artery CVA    Transient blindness     Transient cerebral ischemia        Past Surgical History:   Procedure Laterality Date    BACK SURGERY      CHOLECYSTECTOMY      PARTIAL HYSTERECTOMY      WRIST FRACTURE SURGERY      WRIST FRACTURE SURGERY Left     ORIF distal radius fracture        Medications    Current Facility-Administered Medications:     perflutren lipid microspheres (DEFINITY) injection 1.65 mg, 1.5 mL, Intravenous, ONCE PRN, LEXY Chow, 1.65 mg at 11/10/20 0810    [Held by provider] enoxaparin (LOVENOX) injection 30 mg, 30 mg, Subcutaneous, Daily, Michele Watts MD    levETIRAcetam (KEPPRA) tablet 500 mg, 500 mg, Oral, BID, Michele Watts MD, 500 mg at 11/11/20 0721    sodium chloride flush 0.9 % injection 10 mL, 10 mL, Intravenous, 2 times per day, Evelyn Fong MD, 10 mL at 11/11/20 8429    sodium chloride flush 0.9 % injection 10 mL, 10 mL, Intravenous, PRN, Michele Watts MD    acetaminophen (TYLENOL) tablet 650 mg, 650 mg, Oral, Q4H PRN, 650 mg at 11/09/20 2232 **OR** acetaminophen (TYLENOL) suppository 650 mg, 650 mg, Rectal, Q4H PRN, Michele Watts MD    polyethylene glycol (GLYCOLAX) packet 17 g, 17 g, Oral, Daily PRN, Michele Watts MD    promethazine (PHENERGAN) tablet 12.5 mg, 12.5 mg, Oral, Q6H PRN **OR** ondansetron (ZOFRAN) injection 4 mg, 4 mg, Intravenous, Q6H PRN, Michele Watts MD    calcium carbonate tablet 600 mg, 1 tablet, Oral, BID, Michele Watts MD, 600 mg at 11/11/20 0720    pantoprazole (PROTONIX) tablet 40 mg, 40 mg, Oral, BID AC, Michele Watts MD, 40 mg at 11/11/20 0630    potassium chloride (KLOR-CON M) extended release tablet 20 mEq, 20 mEq, Oral, BID, Michele Watts MD, 20 mEq at 11/11/20 1842    propranolol (INDERAL) tablet 20 mg, 20 mg, Oral, BID, Michele Watts MD, 20 mg at 11/11/20 0721    atorvastatin (LIPITOR) tablet 40 mg, 40 mg, Oral, Nightly, Michele Watts MD, 40 mg at 11/10/20 2043    cefTRIAXone (ROCEPHIN) 1 g in sterile water 10 mL IV syringe, 1 g, Intravenous, Q24H, Michele Watts MD, 1 g at 11/10/20 2039  Asa [aspirin]    Social History  Social History     Tobacco Use   Smoking Status Never Smoker   Smokeless Tobacco Never Used     Social History     Substance and Sexual Activity   Alcohol Use No    Alcohol/week: 0.0 standard drinks         Family History   Problem Relation Age of Onset    Stroke Maternal Grandmother          REVIEW OF SYSTEMS:  Constitutional: No fevers No chills  Neck:No stiffness  Respiratory: No shortness of breath  Cardiovascular: No chest pain No palpitations  Gastrointestinal: No abdominal pain    Genitourinary: No Dysuria  Neurological: No headache, Mild confusion    PHYSICAL EXAM:  Vitals:    11/11/20 0650   BP: 138/63   Pulse: 61   Resp: 16   Temp: 97.4 °F (36.3 °C)   SpO2: 94% Constitutional: The patient appears well-developed and well-nourished. Eyes - conjunctiva normal.  Conjugate Gaze  Ear, nose, throat - No scars, masses, or lesions over external nose or ears, no atrophy of tongue  Neck-symmetric, no masses noted, no jugular vein distension  Respiration- chest wall appears symmetric, good expansion, normal effort without use of accessory muscles  Musculoskeletal - no significant wasting of muscles noted, no bony deformities  Extremities-no clubbing, cyanosis or edema  Skin - warm, dry, and intact. No rash, erythema, or pallor. Psychiatric - mood, affect, and behavior appear normal.     Neurologic Examination  Awake, Alert and oriented x 2, unable to recall president, or the correct year  CN II-XII intact   Unable to name objects or repeat phrases, following commands  Motor 5/5 all extremities, no pronator drift  No deficits to light touch   Reflexes are 2+ and symmetric  Babinski sign was downgoing bilaterally  No clonus or Hoffmans sign      DATA:  Nursing/pcp notes, imaging,labs and vitals reviewed. PT,OT and/or speech notes reviewed    Lab Results   Component Value Date    WBC 10.1 11/11/2020    HGB 12.7 11/11/2020    HCT 40.1 11/11/2020    MCV 85.9 11/11/2020     11/11/2020     Lab Results   Component Value Date     11/11/2020    K 4.8 11/11/2020     11/11/2020    CO2 24 11/11/2020    BUN 14 11/11/2020    CREATININE 1.3 (H) 11/11/2020    GLUCOSE 90 11/11/2020    CALCIUM 9.7 11/11/2020    LABGLOM 39 (A) 11/11/2020    GFRAA 47 (L) 11/11/2020   No results found for: INR, PROTIME    CT head CXOWARE. 11/9/2020  Small spherical hyperdensity over the frontal lobe that does not produce any mass effect     EXAMINATION:  MRA HEAD WO CONTRAST  11/10/2020 12:21 PM    HISTORY: Stroke symptoms    COMPARISON: MR brain performed today    TECHNIQUE: MR angiography brain was performed using 3-D time-of-flight    technique.     2-D axial source images were reviewed. FINDINGS:    The distal vertebral arteries and basilar artery are intact without    significant steno-occlusive changes. In the distal siphon portion of the right internal carotid artery    there is eccentric narrowing may be artifact. Significant    steno-occlusive changes difficult to exclude. There is decreased signal identified in the distal    cavernous/supraclinoid left internal carotid artery which may be    artifact. Significant steno-occlusive changes again not excluded. The distal left middle cerebral artery branches particularly along the    sylvian fissure poorly visualized relative to the contralateral side. Steno-occlusive changes not excluded. The anterior cerebral artery branches are intact. The posterior cerebral artery branches are also poorly visualized    proximally with areas of decreased signal and steno-occlusive changes    not excluded. There is no MR evidence of cerebral aneurysm. CT angiography head may be considered to clarify.         Impression    1. Multifocal areas of decreased signal in the distal internal carotid    arteries, middle and posterior cerebral artery branches as described    above which may be artifactual. Significant steno-occlusive changes    not excluded. Signed by Dr Kim Stinson on 11/10/2020 12:29 PM      EXAMINATION: [address] [facility]  11/10/2020 11:59 AM    HISTORY: History of stroke    COMPARISON: Head CT performed today    TECHNIQUE: Multiplanar MR imaging the brain was performed. Gadolinium    enhanced contrast imaging obtained. FINDINGS:    There is diffusion signal abnormality compatible with acute/subacute    ischemic change involving the left posterior    temporal/occipital/parietal lobe region, watershed vascular    distribution with corresponding T2 signal hyperintensity changes and    intravascular and into parenchymal enhancing gadolinium enhancement.     In the right frontal lobe, corresponding to the 5 mm high attenuation    focus on CT, there is focal area of FLAIR signal hyperintensity and    blooming artifact gradient echo imaging. A small amount of    intraparenchymal hemorrhage considered. No corresponding abnormal    enhancement observed. No additional diffusion signal abnormalities observed. There is no significant mass effect or midline shift. There is no    hydrocephalus    Minimal chronic ischemic white matter changes suspected.         Impression    1. Acute/subacute left temporal/parietal/occipital lobe infarct with    associated teeth 2 signal changes and gallium enhancement. 2. Small high density focus in the right frontal lobe anteriorly    observed on today's CT examination demonstrates increased FLAIR signal    hyperintensity and blooming artifact worrisome for small amount of    acute hemorrhagic change. Signed by Dr Marcia Dykes on 11/10/2020 12:21 PM          IMPRESSION  80year old female with hx of previous left cerebral stroke on ASA and xarelto with acute change in mental status, difficulty speaking and possible seizure like activity with a small sperical right frontal lesion noted on CT head    RECOMMENDATIONS:    -No emergent neurosurgical intervention warranted  -MRI brain revealed acute/subacute stroke. This is likely the result of her acute changes in mental status/aphasia  -Will continue to follow with serial CTs, repeat in in 2-3 more days, continue to hold anticoagulation for now  -Continue Keppra for now, unclear if had seizure.   Will discuss with neurology team further.    -Pt appropriate for discharge from neurosurgery perspective, would benefit from speech therapy    Kaveh Arias DO

## 2020-11-11 NOTE — CARE COORDINATION
Spoke with patient regarding MD orders for [facility] services. Patient unable to answer my questions but was agreeable for me to speak with son. Called son, Rosangela Craig at [phone]. He was agreeable and has chosen [facility]. Referral Faxed. [address] [place] [address] [phone]. [phone]. The Patient's son, Rosangela Craig, was provided with a choice of provider by phone and agrees with the discharge plan. [x] Yes [] No    Freedom of choice list was provided with basic dialogue that supports the patient's individualized plan of care/goals, treatment preferences and shares the quality data associated with the providers.  [x] Yes [] No  Electronically signed by Karie Chambers RN on 11/11/2020 at 2:31 PM

## 2020-11-11 NOTE — PROGRESS NOTES
[facility] [address]        DEPARTMENT OF HOSPITALIST MEDICINE        PROGRESS  NOTE:    NOTE: Portions of this note have been copied forward, however, changed to reflect the most current clinical status of this patient. Patient: Caridad Sommers  YOB: 1933  Date of Service: 11/11/2020  MRN: [id number]   Acct: [de-identified]   Primary Care Physician: Gulshan Alvarado MD  Advance Directive: Full Code  Admit Date: 11/9/2020       Hospital Day: 2      CHIEF COMPLAINT:  No chief complaint on file. SUBJECTIVE:    Patient feels weak and tired. She has some word finding difficulties. I spoke with patient's son at the bedside who feels that patient is not at her baseline and appears confused and has some difficulty finding words. Camila Lal  COURSE:    11/10/2020  Patient underwent extensive work-up from neurology and neurosurgery standpoint including MRI/MRI of brain, carotid arterial duplex. Both neurology and neurosurgery believe that patient had an element of acute stroke which is responsible for her mental status changes. The small focus of intracranial hemorrhage is stable. Patient is off of her anticoagulation at this time. He has been started on Keppra for seizure prevention. 11/9/2020  HISTORY OF PRESENT ILLNESS:  Caridad Sommers is an 80 y.o. female. She is a very pleasant lady who lives alone at home. She has a history of prior stroke and is taking aspirin and Xarelto. She was brought to the ER for evaluation with complaints of altered mental status and difficulty finding words. Family stated the patient had trouble speaking and appeared confused. CAT scan was done which showed old left parietal stroke but a new hypodensity over right frontal lobe concerning for either hemorrhage or vascular lesion. She also had UTI on evaluation in the ER. ER physician spoke with me and wanted to transfer the patient to be evaluated by neurosurgeon.   I accepted the transfer and evaluated the patient at the bedside when she came. She is feeling better, her aphasia has improved, but she is still pleasantly confused which appears to be her baseline. She was evaluated by neurosurgery who ordered further work-up and spoke with me about the case in detail. I also started her on IV Rocephin after obtaining urine cultures for UTI. We will admit the patient for medical management, neurovascular checks, PT/OT/ST evaluation as per TIA/stroke protocol, evaluation by neurosurgery for her intracranial hemorrhage and neurology for strokelike symptoms and to follow-up on their further recommendations. REVIEW  OF  SYSTEMS:    Constitutional:  No fevers, chills, nausea, vomiting,    Lungs:   No hemoptysis, pleurisy   Heart:  No chest pressure with exertion, palpitations,    Abdomen:   No new masses, no bright red blood per rectum   Extremities: No acute pain while ambulating, no new lesions   Neurologic: No new motor or sensory changes       PAST MEDICAL HISTORY:  Past Medical History:   Diagnosis Date    Arthritis     Atrial fibrillation (HCC)     Fatigue     Fracture of distal end of radius     Hyperlipidemia     Hypertension     Osteoarthritis of knee     Osteoporosis     [facility]     Stroke (cerebrum) ([facility] [address] [facility] 06/2015    left posterior cerebral artery CVA    Transient blindness     Transient cerebral ischemia          PAST SURGICAL HISTORY:  Past Surgical History:   Procedure Laterality Date    BACK SURGERY      CHOLECYSTECTOMY      PARTIAL HYSTERECTOMY      WRIST FRACTURE SURGERY      WRIST FRACTURE SURGERY Left     ORIF distal radius fracture        FAMILY HISTORY:  Family History   Problem Relation Age of Onset    Stroke Maternal Grandmother            OBJECTIVE:  BP (!) 117/51   Pulse 61   Temp 97.4 °F (36.3 °C) (Temporal)   Resp 18   SpO2 90%   No intake/output data recorded.     PHYSICAL  EXAMINATION:    JENNI:  Awake, alert, oriented x 3, patient appears tired and fatigued   Head/Eyes:  Normocephalic, atraumatic, EOMI and PERRLA bilaterally   Respiratory:   Bilateral fair air entry in both lung fields, mild B/L crackles, symmetric expansion of chest   Cardiovascular:  Regular rate and rhythm, S1+S2+0, no murmurs/rubs   Abdomen:   Soft, non-tender, bowel sounds +ve, no organomegaly   Extremities: Moves all, full range of motion, no edema   Neurologic: Awake, alert, oriented x 3, cranial nerves II-XII intact, no focal neurological deficits, sensory system intact   Psychiatric: Normal mood, non-suicidal       CURRENT MEDICATIONS:  Scheduled:   [Held by provider] enoxaparin  30 mg Subcutaneous Daily    levETIRAcetam  500 mg Oral BID    sodium chloride flush  10 mL Intravenous 2 times per day    calcium carbonate  1 tablet Oral BID    pantoprazole  40 mg Oral BID AC    potassium chloride  20 mEq Oral BID    propranolol  20 mg Oral BID    atorvastatin  40 mg Oral Nightly    cefTRIAXone (ROCEPHIN) IV  1 g Intravenous Q24H        PRN:  perflutren lipid microspheres, 1.5 mL, ONCE PRN  sodium chloride flush, 10 mL, PRN  acetaminophen, 650 mg, Q4H PRN    Or  acetaminophen, 650 mg, Q4H PRN  polyethylene glycol, 17 g, Daily PRN  promethazine, 12.5 mg, Q6H PRN    Or  ondansetron, 4 mg, Q6H PRN        Infusions:      Laboratory Data:  Recent Labs     11/09/20  2117 11/10/20  0307   WBC 11.5* 10.5   HGB 12.9 12.3    232     Recent Labs     11/09/20  2117 11/10/20  0307    135*   K 3.4* 4.6    99   CO2 25 26   BUN 17 17   CREATININE 1.4* 1.5*   GLUCOSE 114* 102     No results for input(s): AST, ALT, ALB, BILITOT, ALKPHOS in the last 72 hours. Troponin T: No results for input(s): TROPONINI in the last 72 hours. Pro-BNP: No results for input(s): BNP in the last 72 hours. INR: No results for input(s): INR in the last 72 hours.   UA:  Recent Labs     11/09/20  2255   COLORU YELLOW   PHUR 5.0   WBCUA 1   RBCUA 13*   BACTERIA NEGATIVE* CLARITYU Clear   SPECGRAV 1.016   LEUKOCYTESUR Negative   UROBILINOGEN 1.0   BILIRUBINUR Negative   BLOODU LARGE*   GLUCOSEU Negative     A1C:   Recent Labs     11/10/20  0307   LABA1C 5.8     ABG:No results for input(s): PHART, PKX8EHH, PO2ART, LSO5OGO, BEART, HGBAE, A3ZYMWWN, CARBOXHGBART in the last 72 hours. Imaging:    Ct Head Wo Contrast    Result Date: 11/10/2020  Impression: 1. Small 5 mm focus of high attenuation in the right frontal lobe superiorly and anteriorly, differential considerations include a small amount of hemorrhage. 2. Old left posterior parietal/occipital lobe infarcts. Signed by Dr Andrew Nowak on 11/10/2020 8:58 AM    Mra Head Wo Contrast    Result Date: 11/10/2020  1. Multifocal areas of decreased signal in the distal internal carotid arteries, middle and posterior cerebral artery branches as described above which may be artifactual. Significant steno-occlusive changes not excluded. Signed by Dr Andrew Nowak on 11/10/2020 12:29 PM    Mri Brain W Wo Contrast    Result Date: 11/10/2020  1. Acute/subacute left temporal/parietal/occipital lobe infarct with associated teeth 2 signal changes and gallium enhancement. 2. Small high density focus in the right frontal lobe anteriorly observed on today's CT examination demonstrates increased FLAIR signal hyperintensity and blooming artifact worrisome for small amount of acute hemorrhagic change. Signed by Dr Andrew Nowak on 11/10/2020 12:21 PM       ASSESSMENT & PLAN:    Principal Problem:    Intracranial hemorrhage ([address]  Active Problems:    Stroke-like symptoms    Paroxysmal A-fib (HCC)    Hypertension    Hyperlipidemia    Chronic anticoagulation    CVA, old, cognitive deficits    Alzheimer's disease ([address]  Resolved Problems:    * No resolved hospital problems.  *        Continue with current medications  Neurology and neurosurgery notes reviewed, appreciated and agreed with  Continue with PT/OT evaluation and treatment  Follow-up speech therapy recommendations for diet  Continue with telemetry monitoring  Continue with Keppra for seizure prophylaxis   Continue with IV antibiotics for possible UTI   Patient's potassium and phosphate levels have been replaced with aggressive supplementation yesterday  Leukocytosis has improved from 11.5-10.5      Repeat labs in a.m. Electrolyte replacement as per protocol. Patient will be monitored very closely on the floor. Further recommendations as per the hospital course. The patient's management will be taken over by my covering Hospitalist, Dr. Kellee No, in a.m . .. I am signing off! Discharge Plan: The planning as per PT/OT recommendations once patient is cleared by neurology and neurosurgery    Patient  is on DVT prophylaxis  Current medications reviewed  Lab work reviewed  Radiology/Chest x-ray films reviewed  Treatment recommendations from suspecialities reviewed, appreciated and agreed with  Discussed with the nurse and addressed all questions/concerns  Discussed with Patient and/or Family at the bedside in detail . .. they understand and agree with the management plan. Michele Watts MD  11/11/2020 1:14 AM      DISCLAIMER: This note was created with electronic voice recognition which does have occasional errors. If you have any questions regarding the content within the note please do not hesitate to contact me. .. Thanks.

## 2020-11-11 NOTE — PROGRESS NOTES
Physical Therapy  Anat Robbins  [phone]     11/11/20 1048   Subjective   Subjective Patient up in chair and agrees to work with therapy. Transfers   Sit to Stand Contact guard assistance;Stand by assistance   Stand to sit Stand by assistance   Ambulation   Ambulation? Yes   Ambulation 1   Surface level tile   Device Rolling Walker   Assistance Contact guard assistance   Quality of Gait slow, steady, no LOB noted   Distance 76'   Other Activities   Comment Assisted patient to/from the BR. Patient able to perform self care and tolerated standing at the sink to wash her face and hands. Patient ambulated in east and requested to return to the recliner. Patient positioned for comfort with all needs in reach. Short term goals   Time Frame for Short term goals 2 WKS   Short term goal 1  FT WITH RW, SUPERVISION   Activity Tolerance   Activity Tolerance Patient Tolerated treatment well   Safety Devices   Type of devices Call light within reach; Chair alarm in place; Left in chair   Electronically signed by Jules Muñoz PTA on 11/11/2020 at 10:52 AM

## 2020-11-11 NOTE — PROGRESS NOTES
Speech Language Pathology  Facility/Department: [facility] SURG SERVICES   SWALLOW THERAPY  SPEECH LANGUAGE THERAPY     NAME: Caridad Sommers  : 1933  MRN: [id number]    ADMISSION DATE: 2020  ADMITTING DIAGNOSIS: has Paroxysmal A-fib ([facility] [address] Hypertension; Hyperlipidemia; Stroke (cerebrum) ([facility] [address] Intracranial hemorrhage ([facility] [address] Stroke-like symptoms; Chronic anticoagulation; CVA, old, cognitive deficits; and Alzheimer's disease ([facility] [address] on their problem list.    Date of Treat: 2020  Evaluating Therapist: Chalino Shaw    Current Diet level:  Puree consistency with thin liquids    Reason for Referral  Caridad Sommers was referred for a bedside swallow evaluation to assess the efficiency of her swallow function, identify signs and symptoms of aspiration and make recommendations regarding safe dietary consistencies, effective compensatory strategies, and safe eating environment. Impression  Re-assessed patient's swallowing function. Patient exhibits decreased oral prep of more solid consistencies as well as sluggish, inconsistently mildly decreased laryngeal elevation for swallow airway protection. Even so, no outward S/S penetration/aspiration was noted with any puree consistency presentation, regular solid consistency trial, or thin H2O presentation administered during treatment session this date. At this time, would trial minced and moist consistency. Continue thin liquids. Recommend meds in pudding/applesauce. Will continue to follow. Treatment Plan  Requires SLP Intervention: Yes     Recommended Diet and Intervention  Diet Solids Recommendation: Minced and moist   Liquid Consistency Recommendation: Thin   Recommended Form of Meds: Meds in puree as able  Therapeutic Interventions: Patient/Family education;Diet tolerance monitoring; Therapeutic PO trials with SLP     Compensatory Swallowing Strategies  Compensatory Swallowing Strategies: Upright as possible for all oral intake;Small bites/sips;Eat/Feed slowly; Alternate solids and liquids; Remain upright for 30-45 minutes after meals     General  Chart Reviewed: Yes  Behavior/Cognition: Alert; Cooperative  O2 Device: None (Room air)  Communication Observation: (Targeted language this date. Patient was 0% structured responsive speech and structured confrontation naming of items in room at independent level and with provision of max cues/prompts. Patient was 0% automatic speech tasks (counting 1-5, LORIE, months of year) independently and with provision of max cues/prompts. Patient was 50% repeating single words at independent level. Patient was 0% following simple 1 step auditory directions, without tactile cues, independently; patient modeled simple 1 step commands with 50% of opportunities. Patient was 0% answering simple yes/no questions regarding immediate environment and current state of being at independent level and with provision of mod cues/prompts.)  Patient Positioning: Upright in chair  Consistencies Administered: Dysphagia Pureed (Dysphagia I); Regular solid; Thin - cup      Re-assessed patient's swallowing function with the following observations noted:     Oral Phase  Mastication: Regular solid (Patient exhibited decreased rotary jaw movement during oral prep of regular solid consistency trials presented by SLP.)  Oral Phase - Comment: Oral transit of puree consistency presentations and regular solid consistency trials, all presented by SLP, primarily measured 1-2 seconds in length. No puree consistency residue was noted post swallows. Min regular solid consistency residue was observed post swallows;  residue cleared from the mouth with additional dry swallows. Oral transit of thin H2O presentations, administered independently via cup, primarily measured 1-2 seconds in length.      Pharyngeal Phase  Decreased Laryngeal Elevation: (Patient exhibited sluggish, inconsistently mildly decreased laryngeal elevation for swallow airway protection.)  Pharyngeal Phase - Comment: No outward S/S penetration/aspiration was noted with any puree consistency presentation, regular solid consistency trial, or thin H2O presentation administered during treatment session this date. At this time, would trial minced and moist consistency. Continue thin liquids. Recommend meds in pudding/applesauce. Will continue to follow.     Electronically signed by CLAUDIA Castillo on 11/11/2020 at 9:39 AM

## 2020-12-11 ENCOUNTER — OFFICE VISIT (OUTPATIENT)
Dept: NEUROLOGY | Age: 85
End: 2020-12-11
Payer: COMMERCIAL

## 2020-12-11 ENCOUNTER — HOSPITAL ENCOUNTER (OUTPATIENT)
Dept: NON INVASIVE DIAGNOSTICS | Age: 85
Discharge: HOME | End: 2020-12-11
Payer: MEDICARE

## 2020-12-11 VITALS
HEIGHT: 63 IN | WEIGHT: 150 LBS | HEART RATE: 60 BPM | BODY MASS INDEX: 26.58 KG/M2 | DIASTOLIC BLOOD PRESSURE: 79 MMHG | SYSTOLIC BLOOD PRESSURE: 157 MMHG

## 2020-12-11 PROCEDURE — 1090F PRES/ABSN URINE INCON ASSESS: CPT | Performed by: PSYCHIATRY & NEUROLOGY

## 2020-12-11 PROCEDURE — G8417 CALC BMI ABV UP PARAM F/U: HCPCS | Performed by: PSYCHIATRY & NEUROLOGY

## 2020-12-11 PROCEDURE — 99214 OFFICE O/P EST MOD 30 MIN: CPT | Performed by: PSYCHIATRY & NEUROLOGY

## 2020-12-11 PROCEDURE — 1123F ACP DISCUSS/DSCN MKR DOCD: CPT | Performed by: PSYCHIATRY & NEUROLOGY

## 2020-12-11 PROCEDURE — 1111F DSCHRG MED/CURRENT MED MERGE: CPT | Performed by: PSYCHIATRY & NEUROLOGY

## 2020-12-11 PROCEDURE — 93229 REMOTE 30 DAY ECG TECH SUPP: CPT

## 2020-12-11 PROCEDURE — 1036F TOBACCO NON-USER: CPT | Performed by: PSYCHIATRY & NEUROLOGY

## 2020-12-11 PROCEDURE — G8484 FLU IMMUNIZE NO ADMIN: HCPCS | Performed by: PSYCHIATRY & NEUROLOGY

## 2020-12-11 PROCEDURE — G8427 DOCREV CUR MEDS BY ELIG CLIN: HCPCS | Performed by: PSYCHIATRY & NEUROLOGY

## 2020-12-11 PROCEDURE — 4040F PNEUMOC VAC/ADMIN/RCVD: CPT | Performed by: PSYCHIATRY & NEUROLOGY

## 2020-12-11 NOTE — PROGRESS NOTES
Chief Complaint   Patient presents with    Follow-up     hospital follow up, lab results scanned into media       Rob Maura y. o. female who I saw last month when she presented to the hospital with aphasia/confusion, possible new onset seizure and abnormal CT scan of the head.  According to the records, she was taken to an outside ED because she became aphasic.  CT scan showed a hypodensity in the left parietal region as well as an old one in the left occipital region.  Additionally, there was a small hyper dense lesion in the right frontal cortex suspicious for possible vascular lesion or bleed.  Was on aspirin and Xarelto at home.  Reportedly had some seizure-like activity at 1 point and was placed on Keppra. She was seen by neurosurgery who had no further recommendations. Her parietal stroke appeared embolic even though she was compliant with Xarelto. She has had no further seizures and ran out of her Keppra early this morning. She has yet to have a Zio patch. Has been getting outpatient speech therapy although they are not satisfied with the therapist apparently.   Active Ambulatory Problems     Diagnosis Date Noted    Paroxysmal A-fib ([address]     Hypertension     Hyperlipidemia     Stroke (cerebrum) ([address] 06/01/2015    Intracranial hemorrhage (HCC) 11/09/2020    Stroke-like symptoms 11/09/2020    Chronic anticoagulation 11/09/2020    CVA, old, cognitive deficits 11/09/2020    Alzheimer's disease ([facility] [address] 11/09/2020     Resolved Ambulatory Problems     Diagnosis Date Noted    No Resolved Ambulatory Problems     Past Medical History:   Diagnosis Date    Arthritis     Atrial fibrillation (HCC)     Fatigue     Fracture of distal end of radius     Osteoarthritis of knee     Osteoporosis     Transient blindness     Transient cerebral ischemia        Past Surgical History:   Procedure Laterality Date    BACK SURGERY      CHOLECYSTECTOMY      PARTIAL HYSTERECTOMY      WRIST FRACTURE SURGERY      WRIST FRACTURE SURGERY Left     ORIF distal radius fracture       Family History   Problem Relation Age of Onset    Stroke Maternal Grandmother        Allergies   Allergen Reactions    Asa [Aspirin] Swelling     Swelling in leg       Social History     Socioeconomic History    Marital status:      Spouse name: Not on file    Number of children: Not on file    Years of education: Not on file    Highest education level: Not on file   Occupational History    Not on file   Social Needs    Financial resource strain: Not on file    Food insecurity     Worry: Not on file     Inability: Not on file    Transportation needs     Medical: Not on file     Non-medical: Not on file   Tobacco Use    Smoking status: Never Smoker    Smokeless tobacco: Never Used   Substance and Sexual Activity    Alcohol use: No     Alcohol/week: 0.0 standard drinks    Drug use: No    Sexual activity: Not on file   Lifestyle    Physical activity     Days per week: Not on file     Minutes per session: Not on file    Stress: Not on file   Relationships    Social connections     Talks on phone: Not on file     Gets together: Not on file     Attends Catholic service: Not on file     Active member of club or organization: Not on file     Attends meetings of clubs or organizations: Not on file     Relationship status: Not on file    Intimate partner violence     Fear of current or ex partner: Not on file     Emotionally abused: Not on file     Physically abused: Not on file     Forced sexual activity: Not on file   Other Topics Concern    Not on file   Social History Narrative    Not on file       Review of Systems    Constitutional - No fever or chills. No diaphoresis or significant fatigue. HENT -  No tinnitus or significant hearing loss.   Eyes - no sudden vision change or eye pain  Respiratory - no significant shortness of breath or cough  Cardiovascular - no chest pain No palpitations or significant leg swelling  Gastrointestinal - no abdominal swelling or pain. Genitourinary - No difficulty urinating, dysuria  Musculoskeletal - no back pain or myalgia. Skin - no color change or rash  Neurologic - No seizures. No lateralizing weakness. Hematologic - no easy bruising or excessive bleeding. Psychiatric - no severe anxiety or nervousness. All other review of systems are negative.          Current Outpatient Medications   Medication Sig Dispense Refill    calcium carbonate 600 MG TABS tablet Take 1 tablet by mouth 2 times daily      potassium chloride SA (K-DUR;KLOR-CON M) 20 MEQ tablet Take 20 mEq by mouth 2 times daily      lansoprazole (PREVACID) 30 MG capsule Take 30 mg by mouth daily      hydrochlorothiazide (HYDRODIURIL) 25 MG tablet Take 25 mg by mouth daily      propranolol (INDERAL) 20 MG tablet   Take 20 mg by mouth 2 times daily       levETIRAcetam (KEPPRA) 500 MG tablet Take 1 tablet by mouth 2 times daily (Patient not taking: Reported on 12/11/2020) 60 tablet 0    atorvastatin (LIPITOR) 40 MG tablet Take 1 tablet by mouth nightly (Patient not taking: Reported on 12/11/2020) 30 tablet 0     No current facility-administered medications for this visit. Outpatient Medications Marked as Taking for the 12/11/20 encounter (Office Visit) with Shadi St MD   Medication Sig Dispense Refill    calcium carbonate 600 MG TABS tablet Take 1 tablet by mouth 2 times daily      potassium chloride SA (K-DUR;KLOR-CON M) 20 MEQ tablet Take 20 mEq by mouth 2 times daily      lansoprazole (PREVACID) 30 MG capsule Take 30 mg by mouth daily      hydrochlorothiazide (HYDRODIURIL) 25 MG tablet Take 25 mg by mouth daily      propranolol (INDERAL) 20 MG tablet   Take 20 mg by mouth 2 times daily          BP (!) 157/79   Pulse 60   Ht 5' 3\" (1.6 m)   Wt 150 lb (68 kg)   BMI 26.57 kg/m²       Constitutional - well developed, well nourished.     Eyes - conjunctiva normal.  Pupils react to light  Ear, CONTRAST   2. History of recent stroke  Z86.73 LA EXT ECG > 48HR TO 21 DAY RCRD W/CONECT INTL RCRD (Zio Recording)   3. Seizure ([address]  R56.9      Okay to resume aspirin but stay off of anticoagulation due to small bleed noted on scans last month. She remains at risk for future cardioembolic events I would imagine however,. Her most recent 1 occurred while on Xarelto. She will undergo ZIO monitoring. If A. fib found she might be a candidate for the watchman device. Repeat CT scan of the head has been ordered. Continue speech therapy. Stay off of [employer] for now. Patient does not drive and I have advised her not to do so. If nothing else, she has a dense right homonymous hemianopsia which would make it a good idea for her not to drive. Plan  Orders Placed This Encounter   Procedures    CT HEAD WO CONTRAST     Standing Status:   Future     Standing Expiration Date:   12/11/2021     Order Specific Question:   Reason for exam:     Answer:   memory loss    LA EXT ECG > 48HR TO 21 DAY RCRD W/CONECT INTL RCRD (Zio Recording)         Return in about 3 months (around 3/11/2021).     (Please note that portions of this note were completed with a voice recognition program. Efforts were made to edit the dictations but occasionally words are mis-transcribed.)

## 2020-12-14 ENCOUNTER — TELEPHONE (OUTPATIENT)
Dept: NEUROLOGY | Age: 85
End: 2020-12-14

## 2021-03-11 ENCOUNTER — OFFICE VISIT (OUTPATIENT)
Dept: NEUROLOGY | Age: 86
End: 2021-03-11
Payer: MEDICARE

## 2021-03-11 VITALS
SYSTOLIC BLOOD PRESSURE: 149 MMHG | DIASTOLIC BLOOD PRESSURE: 61 MMHG | BODY MASS INDEX: 26.58 KG/M2 | HEART RATE: 57 BPM | HEIGHT: 63 IN | WEIGHT: 150 LBS

## 2021-03-11 DIAGNOSIS — R56.9 SEIZURE (HCC): ICD-10-CM

## 2021-03-11 DIAGNOSIS — I62.9 INTRACRANIAL HEMORRHAGE (HCC): Primary | ICD-10-CM

## 2021-03-11 DIAGNOSIS — Z86.73 HISTORY OF RECENT STROKE: ICD-10-CM

## 2021-03-11 PROCEDURE — G8417 CALC BMI ABV UP PARAM F/U: HCPCS | Performed by: PSYCHIATRY & NEUROLOGY

## 2021-03-11 PROCEDURE — 1123F ACP DISCUSS/DSCN MKR DOCD: CPT | Performed by: PSYCHIATRY & NEUROLOGY

## 2021-03-11 PROCEDURE — 4040F PNEUMOC VAC/ADMIN/RCVD: CPT | Performed by: PSYCHIATRY & NEUROLOGY

## 2021-03-11 PROCEDURE — G8427 DOCREV CUR MEDS BY ELIG CLIN: HCPCS | Performed by: PSYCHIATRY & NEUROLOGY

## 2021-03-11 PROCEDURE — G8484 FLU IMMUNIZE NO ADMIN: HCPCS | Performed by: PSYCHIATRY & NEUROLOGY

## 2021-03-11 PROCEDURE — 1036F TOBACCO NON-USER: CPT | Performed by: PSYCHIATRY & NEUROLOGY

## 2021-03-11 PROCEDURE — 99214 OFFICE O/P EST MOD 30 MIN: CPT | Performed by: PSYCHIATRY & NEUROLOGY

## 2021-03-11 PROCEDURE — 1090F PRES/ABSN URINE INCON ASSESS: CPT | Performed by: PSYCHIATRY & NEUROLOGY

## 2021-03-11 RX ORDER — ASPIRIN 81 MG/1
81 TABLET ORAL DAILY
COMMUNITY

## 2021-03-11 RX ORDER — FERROUS SULFATE TAB EC 324 MG (65 MG FE EQUIVALENT) 324 (65 FE) MG
1 TABLET DELAYED RESPONSE ORAL DAILY
COMMUNITY
Start: 2021-02-13 | End: 2022-03-04 | Stop reason: SDUPTHER

## 2021-03-11 NOTE — PROGRESS NOTES
Chief Complaint   Patient presents with    Follow-up     3mo follow up for intracranial hemorrhage. YSGU 58 y. o. female who I saw last month when she presented to the hospital with aphasia/confusion, possible new onset seizure and abnormal CT scan of the head.  According to the records, she was taken to an outside ED because she became aphasic.  CT scan showed a hypodensity in the left parietal region as well as an old one in the left occipital region.  Additionally, there was a small hyper dense lesion in the right frontal cortex suspicious for possible vascular lesion or bleed.  Was on aspirin and Xarelto at home.  Reportedly had some seizure-like activity at 1 point and was placed on Keppra. She was seen by neurosurgery who had no further recommendations. Her parietal stroke appeared embolic even though she was compliant with Xarelto. She has had no further seizures and has been off of Keppra for several months uneventfully. Her 14-day Zio patch did not show any evidence of atrial fibrillation. I ordered a follow-up CT scan of the head last December but they did not do it for unclear reasons. No further stroke symptoms. She continues on 1 aspirin daily.   Active Ambulatory Problems     Diagnosis Date Noted    Paroxysmal A-fib ([address]     Hypertension     Hyperlipidemia     Stroke (cerebrum) ([address] 06/01/2015    Intracranial hemorrhage (HCC) 11/09/2020    Stroke-like symptoms 11/09/2020    Chronic anticoagulation 11/09/2020    CVA, old, cognitive deficits 11/09/2020    Alzheimer's disease ([address] 11/09/2020     Resolved Ambulatory Problems     Diagnosis Date Noted    No Resolved Ambulatory Problems     Past Medical History:   Diagnosis Date    Arthritis     Atrial fibrillation (HCC)     Fatigue     Fracture of distal end of radius     Osteoarthritis of knee     Osteoporosis     Transient blindness     Transient cerebral ischemia        Past Surgical History:   Procedure Laterality Date  BACK SURGERY      CHOLECYSTECTOMY      PARTIAL HYSTERECTOMY      WRIST FRACTURE SURGERY      WRIST FRACTURE SURGERY Left     ORIF distal radius fracture       Family History   Problem Relation Age of Onset    Stroke Maternal Grandmother        Allergies   Allergen Reactions    Macrobid  [Nitrofurantoin]      Other reaction(s): Vomiting    Asa [Aspirin] Swelling     Swelling in leg       Social History     Socioeconomic History    Marital status:      Spouse name: Not on file    Number of children: Not on file    Years of education: Not on file    Highest education level: Not on file   Occupational History    Not on file   Social Needs    Financial resource strain: Not on file    Food insecurity     Worry: Not on file     Inability: Not on file    Transportation needs     Medical: Not on file     Non-medical: Not on file   Tobacco Use    Smoking status: Never Smoker    Smokeless tobacco: Never Used   Substance and Sexual Activity    Alcohol use: No     Alcohol/week: 0.0 standard drinks    Drug use: No    Sexual activity: Not on file   Lifestyle    Physical activity     Days per week: Not on file     Minutes per session: Not on file    Stress: Not on file   Relationships    Social connections     Talks on phone: Not on file     Gets together: Not on file     Attends Pentecostal service: Not on file     Active member of club or organization: Not on file     Attends meetings of clubs or organizations: Not on file     Relationship status: Not on file    Intimate partner violence     Fear of current or ex partner: Not on file     Emotionally abused: Not on file     Physically abused: Not on file     Forced sexual activity: Not on file   Other Topics Concern    Not on file   Social History Narrative    Not on file       Review of Systems  Constitutional: ?Fever ? Sweats ? Chills ? Recent Injury ? Denies all unless marked   HENT:?Headache ? Head Injury ? Sore Throat ? Ear Pain ? Dizziness ? Hearing Loss ? Denies all unless marked   Spine: ? Neck pain ? Back pain ? Sciaticia ? Denies all unless marked   Cardiovascular:?Chest Pain ? Palpitations ? Heart Disease ? Denies all unless marked   Pulmonary: ? Shortness of Breath ? Cough ? Denies all unless marked   Gastrointestinal: ?Abdominal Pain ? Blood in Stool ? Diarrhea ? Constipation ? Nausea ? Vomiting ? Denies all unless marked   Genitourinary: ? Dysuria ? Frequency ? Incontinence ? Urgency ? Denies all unless marked   Musculoskeletal: ? Arthralgia ? Myalgias ? Muscle cramps ? Muscle twitches   ? Denies all unless marked   Extremities: ? Pain ? Swelling ? Denies all unless marked   Skin:? Rash ? Color Change ? Denies all unless marked   Neurological:? Visual Disturbance ? Double Vision ? Slurred Speech ? Trouble swallowing ? Vertigo ? Tingling ? Numbness ? Weakness ? Loss of Balance   ? Loss of Consciousness ? Memory Loss ? Seizures ? Denies all unless marked   Psychiatric/Behavioral:? Depression ? Anxiety ? Denies all unless marked   Sleep: ? Insomnia ? Sleep Disturbance ? Snoring ? Restless Legs ? Daytime Sleepiness ? Sleep Apnea ? Denies all unless marked          Current Outpatient Medications   Medication Sig Dispense Refill    ferrous sulfate 324 (65 Fe) MG EC tablet Take 1 tablet by mouth daily      aspirin EC 81 MG EC tablet Take 81 mg by mouth daily      calcium carbonate 600 MG TABS tablet Take 1 tablet by mouth 2 times daily      potassium chloride SA (K-DUR;KLOR-CON M) 20 MEQ tablet Take 20 mEq by mouth 2 times daily      lansoprazole (PREVACID) 30 MG capsule Take 30 mg by mouth daily      hydrochlorothiazide (HYDRODIURIL) 25 MG tablet Take 25 mg by mouth daily      propranolol (INDERAL) 20 MG tablet   Take 20 mg by mouth 2 times daily       atorvastatin (LIPITOR) 40 MG tablet Take 1 tablet by mouth nightly (Patient not taking: Reported on 12/11/2020) 30 tablet 0     No current facility-administered medications for this visit. Outpatient Medications Marked as Taking for the 3/11/21 encounter (Office Visit) with Maria Ines Nguyen MD   Medication Sig Dispense Refill    ferrous sulfate 324 (65 Fe) MG EC tablet Take 1 tablet by mouth daily      aspirin EC 81 MG EC tablet Take 81 mg by mouth daily      calcium carbonate 600 MG TABS tablet Take 1 tablet by mouth 2 times daily      potassium chloride SA (K-DUR;KLOR-CON M) 20 MEQ tablet Take 20 mEq by mouth 2 times daily      lansoprazole (PREVACID) 30 MG capsule Take 30 mg by mouth daily      hydrochlorothiazide (HYDRODIURIL) 25 MG tablet Take 25 mg by mouth daily      propranolol (INDERAL) 20 MG tablet   Take 20 mg by mouth 2 times daily          BP (!) 149/61   Pulse 57   Ht 5' 3\" (1.6 m)   Wt 150 lb (68 kg)   BMI 26.57 kg/m²       Constitutional - well developed, well nourished. Eyes - conjunctiva normal.  Pupils react to light  Ear, nose, throat -hearing intact to finger rub No scars, masses, or lesions over external nose or ears, no atrophy of tongue  Neck-symmetric, no masses noted, no jugular vein distension. No bruits noted. Respiration- chest wall appears symmetric, good expansion,   normal effort without use of accessory muscles  Cardiovascular- RRR  Musculoskeletal - no significant wasting of muscles noted, no bony deformities, gait no gross ataxia  Extremities-no clubbing, cyanosis or edema  Skin - warm, dry, and intact. No rash, erythema, or pallor. Psychiatric - mood, affect, and behavior appear normal.      Neurological exam  Awake, alert, fluent but has trouble with word finding and is hard of hearing. Did poorly with complex commands. Cranial Nerve Exam   CN II- Visual fields intact to confrontation VA adequate.    CN III, IV,VI- PERRLA, EOMI, No nystagmus, conjugate eye movements, no ptosis  CN VII-no facial asymmetry  CN VIII-Hearing intact   CN IX and X- Palate elevates in midline  CN XI-good shoulder shrug  CN XII-Tongue midline with no fasciculations or fibrillations    Motor Exam  Relatively normal.       Reflexes   Trace throughout    Tremors- no tremors in hands or head noted    Gait  Uses a quad cane for balance    Coordination  Finger to nose and ANA LAURA-unremarkable    Lab Results   Component Value Date    KGIRSOZC41 364 11/09/2020     Lab Results   Component Value Date    WBC 10.1 11/11/2020    HGB 12.7 11/11/2020    HCT 40.1 11/11/2020    MCV 85.9 11/11/2020     11/11/2020     Lab Results   Component Value Date     11/11/2020    K 4.8 11/11/2020     11/11/2020    CO2 24 11/11/2020    BUN 14 11/11/2020    CREATININE 1.3 (H) 11/11/2020    GLUCOSE 90 11/11/2020    CALCIUM 9.7 11/11/2020    LABGLOM 39 (A) 11/11/2020    GFRAA 47 (L) 11/11/2020           Assessment    ICD-10-CM    1. Intracranial hemorrhage (HCC)  I62.9    2. History of recent stroke  Z86.73    3. Seizure ([facility] [address]  R56.9      Okay to resume aspirin but stay off of anticoagulation due to small bleed noted on scans . She remains at risk for future cardioembolic events I would imagine, however,. Her most recent 1 occurred while on Xarelto. No evidence of atrial fibrillation currently. Hopefully she will have no further strokes nor seizures but it is at risk for both of them. We had an extended discussion regarding all of this. No further neurological recommendations currently. Plan      Return if symptoms worsen or fail to improve.     (Please note that portions of this note were completed with a voice recognition program. Efforts were made to edit the dictations but occasionally words are mis-transcribed.)

## 2021-05-04 ENCOUNTER — OFFICE VISIT (OUTPATIENT)
Dept: FAMILY MEDICINE CLINIC | Facility: CLINIC | Age: 86
End: 2021-05-04

## 2021-05-04 VITALS
HEART RATE: 56 BPM | BODY MASS INDEX: 27.29 KG/M2 | DIASTOLIC BLOOD PRESSURE: 60 MMHG | OXYGEN SATURATION: 96 % | SYSTOLIC BLOOD PRESSURE: 122 MMHG | RESPIRATION RATE: 18 BRPM | WEIGHT: 154 LBS | HEIGHT: 63 IN

## 2021-05-04 DIAGNOSIS — H61.22 IMPACTED CERUMEN OF LEFT EAR: Primary | ICD-10-CM

## 2021-05-04 PROBLEM — I69.319 CVA, OLD, COGNITIVE DEFICITS: Status: ACTIVE | Noted: 2020-11-09

## 2021-05-04 PROBLEM — I48.0 PAROXYSMAL A-FIB (HCC): Status: ACTIVE | Noted: 2021-05-04

## 2021-05-04 PROBLEM — R29.90 STROKE-LIKE SYMPTOMS: Status: ACTIVE | Noted: 2020-11-09

## 2021-05-04 PROBLEM — E78.5 HYPERLIPIDEMIA: Status: ACTIVE | Noted: 2021-05-04

## 2021-05-04 PROBLEM — G30.9 ALZHEIMER'S DISEASE (HCC): Status: ACTIVE | Noted: 2020-11-09

## 2021-05-04 PROBLEM — Z79.01 CHRONIC ANTICOAGULATION: Status: ACTIVE | Noted: 2020-11-09

## 2021-05-04 PROBLEM — I10 HYPERTENSION: Status: ACTIVE | Noted: 2021-05-04

## 2021-05-04 PROBLEM — F02.80 ALZHEIMER'S DISEASE (HCC): Status: ACTIVE | Noted: 2020-11-09

## 2021-05-04 PROBLEM — I62.9 INTRACRANIAL HEMORRHAGE (HCC): Status: ACTIVE | Noted: 2020-11-09

## 2021-05-04 PROCEDURE — 69210 REMOVE IMPACTED EAR WAX UNI: CPT | Performed by: NURSE PRACTITIONER

## 2021-05-04 PROCEDURE — 99203 OFFICE O/P NEW LOW 30 MIN: CPT | Performed by: NURSE PRACTITIONER

## 2021-05-04 RX ORDER — FERROUS SULFATE 324(65)MG
1 TABLET, DELAYED RELEASE (ENTERIC COATED) ORAL
COMMUNITY
Start: 2021-02-13

## 2021-05-04 RX ORDER — POTASSIUM CHLORIDE 20 MEQ/1
20 TABLET, EXTENDED RELEASE ORAL
COMMUNITY

## 2021-05-04 RX ORDER — HYDROCHLOROTHIAZIDE 25 MG/1
25 TABLET ORAL
COMMUNITY

## 2021-05-04 RX ORDER — PROPRANOLOL HYDROCHLORIDE 20 MG/1
20 TABLET ORAL
COMMUNITY

## 2021-05-04 RX ORDER — LANSOPRAZOLE 30 MG/1
30 CAPSULE, DELAYED RELEASE ORAL
COMMUNITY
End: 2021-05-04

## 2021-05-04 RX ORDER — ATORVASTATIN CALCIUM 40 MG/1
40 TABLET, FILM COATED ORAL
COMMUNITY
Start: 2020-11-11 | End: 2021-05-04

## 2021-05-04 RX ORDER — ASPIRIN 81 MG/1
81 TABLET ORAL
COMMUNITY

## 2021-05-04 NOTE — PATIENT INSTRUCTIONS
Earwax Buildup, Adult  The ears produce a substance called earwax that helps keep bacteria out of the ear and protects the skin in the ear canal. Occasionally, earwax can build up in the ear and cause discomfort or hearing loss.  What increases the risk?  This condition is more likely to develop in people who:  · Are male.  · Are elderly.  · Naturally produce more earwax.  · Clean their ears often with cotton swabs.  · Use earplugs often.  · Use in-ear headphones often.  · Wear hearing aids.  · Have narrow ear canals.  · Have earwax that is overly thick or sticky.  · Have eczema.  · Are dehydrated.  · Have excess hair in the ear canal.  What are the signs or symptoms?  Symptoms of this condition include:  · Reduced or muffled hearing.  · A feeling of fullness in the ear or feeling that the ear is plugged.  · Fluid coming from the ear.  · Ear pain.  · Ear itch.  · Ringing in the ear.  · Coughing.  · An obvious piece of earwax that can be seen inside the ear canal.  How is this diagnosed?  This condition may be diagnosed based on:  · Your symptoms.  · Your medical history.  · An ear exam. During the exam, your health care provider will look into your ear with an instrument called an otoscope.  You may have tests, including a hearing test.  How is this treated?  This condition may be treated by:  · Using ear drops to soften the earwax.  · Having the earwax removed by a health care provider. The health care provider may:  ? Flush the ear with water.  ? Use an instrument that has a loop on the end (curette).  ? Use a suction device.  · Surgery to remove the wax buildup. This may be done in severe cases.  Follow these instructions at home:    · Take over-the-counter and prescription medicines only as told by your health care provider.  · Do not put any objects, including cotton swabs, into your ear. You can clean the opening of your ear canal with a washcloth or facial tissue.  · Follow instructions from your health care  provider about cleaning your ears. Do not over-clean your ears.  · Drink enough fluid to keep your urine clear or pale yellow. This will help to thin the earwax.  · Keep all follow-up visits as told by your health care provider. If earwax builds up in your ears often or if you use hearing aids, consider seeing your health care provider for routine, preventive ear cleanings. Ask your health care provider how often you should schedule your cleanings.  · If you have hearing aids, clean them according to instructions from the  and your health care provider.  Contact a health care provider if:  · You have ear pain.  · You develop a fever.  · You have blood, pus, or other fluid coming from your ear.  · You have hearing loss.  · You have ringing in your ears that does not go away.  · Your symptoms do not improve with treatment.  · You feel like the room is spinning (vertigo).  Summary  · Earwax can build up in the ear and cause discomfort or hearing loss.  · The most common symptoms of this condition include reduced or muffled hearing and a feeling of fullness in the ear or feeling that the ear is plugged.  · This condition may be diagnosed based on your symptoms, your medical history, and an ear exam.  · This condition may be treated by using ear drops to soften the earwax or by having the earwax removed by a health care provider.  · Do not put any objects, including cotton swabs, into your ear. You can clean the opening of your ear canal with a washcloth or facial tissue.  This information is not intended to replace advice given to you by your health care provider. Make sure you discuss any questions you have with your health care provider.  Document Revised: 11/30/2018 Document Reviewed: 02/28/2018  Elsevier Patient Education © 2021 [employer]

## 2021-05-04 NOTE — PROGRESS NOTES
"Chief Complaint  Hearing Problem    Subjective    Laila Crystal presents to [facility]  Decreased hearing.  Says ear doesn't hurt, I just can't hear from my left ear.  I am having to speak loudly to communicate.  It started about 1 week ago.  Denies any pain, dizziness or fever.  Has multiple medical problems: Alzheimers, chronic anticoagulation after a stroke, anemia stable with ferrous sulfate, HTN stable with hctz, hypokalemia stable with k-dur. Denies any pain     Ear Fullness   There is pain in the left ear. This is a new problem. The current episode started in the past 7 days. The problem occurs constantly. The problem has been gradually worsening. There has been no fever. The pain is at a severity of 0/10. The patient is experiencing no pain. Associated symptoms include hearing loss. Pertinent negatives include no coughing, rash, rhinorrhea, sore throat or vomiting. She has tried nothing for the symptoms. The treatment provided no relief.       Objective   Vital Signs:   /60 (BP Location: Left arm, Patient Position: Sitting, Cuff Size: Adult)   Pulse 56   Resp 18   Ht 160 cm (63\") Comment: per patient  Wt 69.9 kg (154 lb)   SpO2 96%   BMI 27.28 kg/m²     Physical Exam  Vitals and nursing note reviewed.   Constitutional:       General: She is awake.      Appearance: Normal appearance. She is well-developed and well-groomed.   HENT:      Head: Normocephalic and atraumatic.      Right Ear: Hearing, tympanic membrane, ear canal and external ear normal.      Left Ear: Decreased hearing noted. There is impacted cerumen.      Nose: Nose normal.      Mouth/Throat:      Lips: Pink.   Cardiovascular:      Rate and Rhythm: Regular rhythm. Bradycardia present.      Heart sounds: Normal heart sounds.   Pulmonary:      Effort: Pulmonary effort is normal.      Breath sounds: Normal breath sounds and air entry.   Abdominal:      General: Abdomen is flat. Bowel sounds are normal.     "  Palpations: Abdomen is soft.   Musculoskeletal:      Right lower le+ Edema present.      Left lower le+ Edema present.   Lymphadenopathy:      Head:      Right side of head: No submental, submandibular or tonsillar adenopathy.      Left side of head: No submental, submandibular or tonsillar adenopathy.   Skin:     General: Skin is warm and dry.      Capillary Refill: Capillary refill takes less than 2 seconds.   Neurological:      Mental Status: She is alert and oriented to person, place, and time.      Sensory: Sensation is intact.      Coordination: Coordination is intact.      Gait: Gait abnormal.   Psychiatric:         Mood and Affect: Mood normal.         Speech: Speech normal.         Behavior: Behavior is cooperative.         Thought Content: Thought content normal.            Assessment and Plan   Diagnoses and all orders for this visit:    1. Impacted cerumen of left ear (Primary)  -     Ear Cerumen Removal      I spent 14 minutes caring for Laila on this date of service. This time includes time spent by me in the following activities:preparing for the visit, performing a medically appropriate examination and/or evaluation , counseling and educating the patient/family/caregiver, documenting information in the medical record and care coordination     Ear Cerumen Removal    Date/Time: 2021 1:04 PM  Performed by: Mayra Santo DNP, MAYUR  Authorized by: Mayra Santo DNP, APRN     Anesthesia:  Local Anesthetic: none  Location details: left ear  Patient tolerance: patient tolerated the procedure well with no immediate complications  Comments: The risks benefits and alternative options discussed with pt, she wishes to proceed with cerumen removal.  The Right TM is normal pearly gray and translucent.  Left ear is impacted with cerumen unable to visualize.  Curette  Was used to remove some of the cerumen but I can't get all of it.  Ear Irrigation performed to remove 2 large hard pieces of  cerumen.  Re-evaluation of the ear shows a normal TM pearly gray and translucent   Procedure type: instrumentation and irrigation   Sedation:  Patient sedated: no            Follow Up     Return in about 1 week (around 5/11/2021), or if symptoms worsen or fail to improve.     Patient was given instructions and counseling regarding her condition or for health maintenance advice. Please see specific information pulled into the AVS if appropriate.     Electronically signed by Mayra Santo DNP, APRN, 05/04/21, 1:07 PM CDT.

## 2021-10-03 ENCOUNTER — APPOINTMENT (OUTPATIENT)
Dept: GENERAL RADIOLOGY | Facility: HOSPITAL | Age: 86
End: 2021-10-03

## 2021-10-03 ENCOUNTER — APPOINTMENT (OUTPATIENT)
Dept: CT IMAGING | Facility: HOSPITAL | Age: 86
End: 2021-10-03

## 2021-10-03 ENCOUNTER — HOSPITAL ENCOUNTER (EMERGENCY)
Facility: HOSPITAL | Age: 86
Discharge: ANOTHER HOSPITAL | End: 2021-10-04
Attending: EMERGENCY MEDICINE | Admitting: EMERGENCY MEDICINE

## 2021-10-03 DIAGNOSIS — I63.512 LEFT MIDDLE CEREBRAL ARTERY STROKE (HCC): Primary | ICD-10-CM

## 2021-10-03 LAB
BASOPHILS # BLD AUTO: 0.09 10*3/MM3 (ref 0–0.2)
BASOPHILS NFR BLD AUTO: 0.7 % (ref 0–1.5)
DEPRECATED RDW RBC AUTO: 43.2 FL (ref 37–54)
EOSINOPHIL # BLD AUTO: 0.28 10*3/MM3 (ref 0–0.4)
EOSINOPHIL NFR BLD AUTO: 2.1 % (ref 0.3–6.2)
ERYTHROCYTE [DISTWIDTH] IN BLOOD BY AUTOMATED COUNT: 13.9 % (ref 12.3–15.4)
GLUCOSE BLDC GLUCOMTR-MCNC: 165 MG/DL (ref 70–130)
HCT VFR BLD AUTO: 41.6 % (ref 34–46.6)
HGB BLD-MCNC: 13.2 G/DL (ref 12–15.9)
IMM GRANULOCYTES # BLD AUTO: 0.1 10*3/MM3 (ref 0–0.05)
IMM GRANULOCYTES NFR BLD AUTO: 0.8 % (ref 0–0.5)
INR PPP: 0.96 (ref 0.91–1.09)
LYMPHOCYTES # BLD AUTO: 1.68 10*3/MM3 (ref 0.7–3.1)
LYMPHOCYTES NFR BLD AUTO: 12.6 % (ref 19.6–45.3)
MCH RBC QN AUTO: 27.2 PG (ref 26.6–33)
MCHC RBC AUTO-ENTMCNC: 31.7 G/DL (ref 31.5–35.7)
MCV RBC AUTO: 85.6 FL (ref 79–97)
MONOCYTES # BLD AUTO: 0.9 10*3/MM3 (ref 0.1–0.9)
MONOCYTES NFR BLD AUTO: 6.8 % (ref 5–12)
NEUTROPHILS NFR BLD AUTO: 10.24 10*3/MM3 (ref 1.7–7)
NEUTROPHILS NFR BLD AUTO: 77 % (ref 42.7–76)
NRBC BLD AUTO-RTO: 0 /100 WBC (ref 0–0.2)
PLATELET # BLD AUTO: 239 10*3/MM3 (ref 140–450)
PMV BLD AUTO: 11.5 FL (ref 6–12)
PROTHROMBIN TIME: 12.4 SECONDS (ref 11.9–14.6)
RBC # BLD AUTO: 4.86 10*6/MM3 (ref 3.77–5.28)
WBC # BLD AUTO: 13.29 10*3/MM3 (ref 3.4–10.8)

## 2021-10-03 PROCEDURE — 0042T HC CT CEREBRAL PERFUSION W/WO CONTRAST: CPT

## 2021-10-03 PROCEDURE — 70498 CT ANGIOGRAPHY NECK: CPT

## 2021-10-03 PROCEDURE — 86900 BLOOD TYPING SEROLOGIC ABO: CPT | Performed by: EMERGENCY MEDICINE

## 2021-10-03 PROCEDURE — 85025 COMPLETE CBC W/AUTO DIFF WBC: CPT | Performed by: EMERGENCY MEDICINE

## 2021-10-03 PROCEDURE — 93005 ELECTROCARDIOGRAM TRACING: CPT | Performed by: EMERGENCY MEDICINE

## 2021-10-03 PROCEDURE — 71045 X-RAY EXAM CHEST 1 VIEW: CPT

## 2021-10-03 PROCEDURE — 99285 EMERGENCY DEPT VISIT HI MDM: CPT

## 2021-10-03 PROCEDURE — 70496 CT ANGIOGRAPHY HEAD: CPT

## 2021-10-03 PROCEDURE — 87635 SARS-COV-2 COVID-19 AMP PRB: CPT | Performed by: EMERGENCY MEDICINE

## 2021-10-03 PROCEDURE — 86901 BLOOD TYPING SEROLOGIC RH(D): CPT | Performed by: EMERGENCY MEDICINE

## 2021-10-03 PROCEDURE — 70450 CT HEAD/BRAIN W/O DYE: CPT

## 2021-10-03 PROCEDURE — 85610 PROTHROMBIN TIME: CPT | Performed by: EMERGENCY MEDICINE

## 2021-10-03 PROCEDURE — 86850 RBC ANTIBODY SCREEN: CPT | Performed by: EMERGENCY MEDICINE

## 2021-10-03 PROCEDURE — 0 IOPAMIDOL PER 1 ML: Performed by: EMERGENCY MEDICINE

## 2021-10-03 PROCEDURE — 80053 COMPREHEN METABOLIC PANEL: CPT | Performed by: EMERGENCY MEDICINE

## 2021-10-03 PROCEDURE — 99284 EMERGENCY DEPT VISIT MOD MDM: CPT

## 2021-10-03 PROCEDURE — 82962 GLUCOSE BLOOD TEST: CPT

## 2021-10-03 PROCEDURE — P9612 CATHETERIZE FOR URINE SPEC: HCPCS

## 2021-10-03 RX ORDER — SODIUM CHLORIDE 0.9 % (FLUSH) 0.9 %
10 SYRINGE (ML) INJECTION AS NEEDED
Status: DISCONTINUED | OUTPATIENT
Start: 2021-10-03 | End: 2021-10-04 | Stop reason: HOSPADM

## 2021-10-03 RX ADMIN — IOPAMIDOL 125 ML: 755 INJECTION, SOLUTION INTRAVENOUS at 23:33

## 2021-10-04 VITALS
TEMPERATURE: 98.3 F | BODY MASS INDEX: 29.92 KG/M2 | RESPIRATION RATE: 16 BRPM | WEIGHT: 158.5 LBS | HEIGHT: 61 IN | DIASTOLIC BLOOD PRESSURE: 52 MMHG | SYSTOLIC BLOOD PRESSURE: 149 MMHG | HEART RATE: 73 BPM | OXYGEN SATURATION: 96 %

## 2021-10-04 LAB
ABO GROUP BLD: NORMAL
ALBUMIN SERPL-MCNC: 4 G/DL (ref 3.5–5.2)
ALBUMIN/GLOB SERPL: 1.4 G/DL
ALP SERPL-CCNC: 83 U/L (ref 39–117)
ALT SERPL W P-5'-P-CCNC: 8 U/L (ref 1–33)
ANION GAP SERPL CALCULATED.3IONS-SCNC: 9 MMOL/L (ref 5–15)
AST SERPL-CCNC: 16 U/L (ref 1–32)
BACTERIA UR QL AUTO: ABNORMAL /HPF
BILIRUB SERPL-MCNC: 0.3 MG/DL (ref 0–1.2)
BILIRUB UR QL STRIP: NEGATIVE
BLD GP AB SCN SERPL QL: NEGATIVE
BUN SERPL-MCNC: 22 MG/DL (ref 8–23)
BUN/CREAT SERPL: 18.2 (ref 7–25)
CALCIUM SPEC-SCNC: 9.9 MG/DL (ref 8.6–10.5)
CHLORIDE SERPL-SCNC: 100 MMOL/L (ref 98–107)
CLARITY UR: CLEAR
CO2 SERPL-SCNC: 30 MMOL/L (ref 22–29)
COLOR UR: YELLOW
CREAT SERPL-MCNC: 1.21 MG/DL (ref 0.57–1)
GFR SERPL CREATININE-BSD FRML MDRD: 42 ML/MIN/1.73
GLOBULIN UR ELPH-MCNC: 2.8 GM/DL
GLUCOSE SERPL-MCNC: 166 MG/DL (ref 65–99)
GLUCOSE UR STRIP-MCNC: NEGATIVE MG/DL
HGB UR QL STRIP.AUTO: ABNORMAL
HOLD SPECIMEN: NORMAL
HYALINE CASTS UR QL AUTO: ABNORMAL /LPF
KETONES UR QL STRIP: NEGATIVE
LEUKOCYTE ESTERASE UR QL STRIP.AUTO: NEGATIVE
NITRITE UR QL STRIP: NEGATIVE
PH UR STRIP.AUTO: 7 [PH] (ref 5–8)
POTASSIUM SERPL-SCNC: 4 MMOL/L (ref 3.5–5.2)
PROT SERPL-MCNC: 6.8 G/DL (ref 6–8.5)
PROT UR QL STRIP: NEGATIVE
QT INTERVAL: 428 MS
QTC INTERVAL: 448 MS
RBC # UR: ABNORMAL /HPF
REF LAB TEST METHOD: ABNORMAL
RH BLD: POSITIVE
SARS-COV-2 RNA PNL SPEC NAA+PROBE: NOT DETECTED
SODIUM SERPL-SCNC: 139 MMOL/L (ref 136–145)
SP GR UR STRIP: >=1.03 (ref 1–1.03)
SQUAMOUS #/AREA URNS HPF: ABNORMAL /HPF
T&S EXPIRATION DATE: NORMAL
UROBILINOGEN UR QL STRIP: ABNORMAL
WBC UR QL AUTO: ABNORMAL /HPF
WHOLE BLOOD HOLD SPECIMEN: NORMAL
WHOLE BLOOD HOLD SPECIMEN: NORMAL

## 2021-10-04 PROCEDURE — 81001 URINALYSIS AUTO W/SCOPE: CPT | Performed by: EMERGENCY MEDICINE

## 2021-10-04 PROCEDURE — 93010 ELECTROCARDIOGRAM REPORT: CPT | Performed by: INTERNAL MEDICINE

## 2021-10-04 NOTE — ED NOTES
[facility] fixed winged is unavailable for transport.     Geri Huddleston  10/04/21 0039

## 2021-10-04 NOTE — ED PROVIDER NOTES
Subjective   87 y/o female arrives for evaluation of AMS. The patient was with the son who was working on her words with her (he tells me that around 11 months ago she had a possible hemorrhagic stroke and since that time has had issues with words so he was working on her words with her at this time at 2200 10/3 when she suddenly became unresponsive prompting his call to 911). Son tells me that besides her speech she never had any other deficits and was able to walk without issue after he CVA. He denies any current anticoagulation. He denies she was given TPa during her last stroke. On review of her last CVA this was at [facility] they do mention hemorrhagic CVA several times (see my notes below). Patient arrives with NIh of 16 and unable to speak to me at all. She will follow commands but is flacid on the right side. Given this a code stroke was called. Given her history of hemorrhagic CVA Dr. Arndt does not feel this is a TPA candidate.           Review of Systems   Unable to perform ROS: Mental status change       No past medical history on file.    Allergies   Allergen Reactions   • Nitrofurantoin GI Intolerance     Other reaction(s): Vomiting   • Aspirin Swelling     Swelling in leg       No past surgical history on file.    No family history on file.    Social History     Socioeconomic History   • Marital status:      Spouse name: Not on file   • Number of children: Not on file   • Years of education: Not on file   • Highest education level: Not on file   Tobacco Use   • Smoking status: Never Smoker   • Smokeless tobacco: Never Used           Objective   Physical Exam  Vitals and nursing note reviewed.   Constitutional:       Appearance: Normal appearance. She is normal weight.   HENT:      Head: Normocephalic and atraumatic.      Right Ear: External ear normal.      Left Ear: External ear normal.      Nose: Nose normal.      Mouth/Throat:      Mouth: Mucous membranes are moist.      Pharynx:  Oropharynx is clear.   Eyes:      Conjunctiva/sclera: Conjunctivae normal.      Pupils: Pupils are equal, round, and reactive to light.   Cardiovascular:      Rate and Rhythm: Normal rate and regular rhythm.      Pulses: Normal pulses.      Heart sounds: Normal heart sounds.   Pulmonary:      Effort: Pulmonary effort is normal.      Breath sounds: Normal breath sounds.   Abdominal:      General: Abdomen is flat. Bowel sounds are normal. There is no distension.      Palpations: There is no mass.      Tenderness: There is no abdominal tenderness.      Hernia: No hernia is present.   Musculoskeletal:      Cervical back: Normal range of motion.   Neurological:      Mental Status: She is alert.      GCS: GCS eye subscore is 4. GCS verbal subscore is 1. GCS motor subscore is 6.      Cranial Nerves: Cranial nerve deficit and facial asymmetry present.      Sensory: No sensory deficit.      Motor: Weakness and pronator drift present.      Coordination: Coordination normal.      Comments: flacid on the right, left sided gaze deviation, right facial droop, does follow commands on the left but does not speak.    Psychiatric:         Mood and Affect: Mood normal.         Behavior: Behavior normal.         Procedures           ED Course    [facility] has accepted    We are trying to fly but being told there is fog, will try fixed wing and other modalities to get the patient there. Son Sony is aware.   ED Course as of Oct 04 0046   Mon Oct 04, 2021   0002 CT head read by stat rad as no acute process.     []   0013 I spoke with Dr. Carty at [facility] and states she may be a candiate for retrival. Will now call the transfer center.     []   0017 [facility] is stating they are on critical capacity will talk to their supervisors.     []   0045 Dr. Arndt does not feel this is a TPA stroke     []      ED Course User Index  [JH] Héctor Maharaj MD            XR Chest 1 View   ED Interpretation   No acute findngsin       CT Head  Without Contrast Stroke Protocol    (Results Pending)   CT Angiogram Head w AI Analysis of LVO    (Results Pending)   CT Angiogram Neck    (Results Pending)   CT CEREBRAL PERFUSION WITH & WITHOUT CONTRAST    (Results Pending)     Labs Reviewed   COMPREHENSIVE METABOLIC PANEL - Abnormal; Notable for the following components:       Result Value    Glucose 166 (*)     Creatinine 1.21 (*)     CO2 30.0 (*)     eGFR Non  Amer 42 (*)     All other components within normal limits    Narrative:     GFR Normal >60  Chronic Kidney Disease <60  Kidney Failure <15     CBC WITH AUTO DIFFERENTIAL - Abnormal; Notable for the following components:    WBC 13.29 (*)     Neutrophil % 77.0 (*)     Lymphocyte % 12.6 (*)     Immature Grans % 0.8 (*)     Neutrophils, Absolute 10.24 (*)     Immature Grans, Absolute 0.10 (*)     All other components within normal limits   URINALYSIS W/ CULTURE IF INDICATED - Abnormal; Notable for the following components:    Blood, UA Moderate (2+) (*)     All other components within normal limits   URINALYSIS, MICROSCOPIC ONLY - Abnormal; Notable for the following components:    RBC, UA 13-20 (*)     WBC, UA 0-2 (*)     Squamous Epithelial Cells, UA 3-6 (*)     All other components within normal limits   POCT GLUCOSE FINGERSTICK - Abnormal; Notable for the following components:    Glucose 165 (*)     All other components within normal limits   PROTIME-INR - Normal   COVID PRE-OP / PRE-PROCEDURE SCREENING ORDER (NO ISOLATION)    Narrative:     The following orders were created for panel order COVID PRE-OP / PRE-PROCEDURE SCREENING ORDER (NO ISOLATION) - Swab, Nasal Cavity.  Procedure                               Abnormality         Status                     ---------                               -----------         ------                     COVID-19,[facility]...[[id number]]                      In process                   Please view results for these tests on the individual orders.    COVID-19,[facility] IN-HOUSE,NASAL SWAB NO TRANSPORT MEDIA 2 HR TAT   RAINBOW DRAW    Narrative:     The following orders were created for panel order [place] Draw.  Procedure                               Abnormality         Status                     ---------                               -----------         ------                     Green Top (Gel)[[id number]]                                  Final result               Lavender Top[[id number]]                                     Final result               Red Top[[id number]]                                                                     Light Blue Top[[id number]]                                   Final result                 Please view results for these tests on the individual orders.   POCT GLUCOSE FINGERSTICK   TYPE AND SCREEN   CBC AND DIFFERENTIAL    Narrative:     The following orders were created for panel order CBC & Differential.  Procedure                               Abnormality         Status                     ---------                               -----------         ------                     CBC Auto Differential[[id number]]        Abnormal            Final result                 Please view results for these tests on the individual orders.   GREEN TOP   LAVENDER TOP   LIGHT BLUE TOP   RED TOP           Gene Interiano MD - 03/11/2021 9:39 AM CST  Formatting of this note is different from the original.  Chief Complaint   Patient presents with   • Follow-up   3mo follow up for intracranial hemorrhage.     This 87 y.o. female who I saw last month when she presented to the hospital with aphasia/confusion, possible new onset seizure and abnormal CT scan of the head.  According to the records, she was taken to an outside ED because she became aphasic.  CT scan showed a hypodensity in the left parietal region as well as an old one in the left occipital region.  Additionally, there was a small hyper dense lesion in the right frontal cortex suspicious  for possible vascular lesion or bleed.  Was on aspirin and Xarelto at home.  Reportedly had some seizure-like activity at 1 point and was placed on Keppra.   She was seen by neurosurgery who had no further recommendations. Her parietal stroke appeared embolic even though she was compliant with Xarelto. She has had no further seizures and has been off of Keppra for several months uneventfully. Her 14-day Zio patch did not show any evidence of atrial fibrillation. I ordered a follow-up CT scan of the head last December but they did not do it for unclear reasons. No further stroke symptoms. She continues on 1 aspirin daily.  Active Ambulatory Problems   Diagnosis Date Noted   • Paroxysmal A-fib ([facility])   • Hypertension   • Hyperlipidemia   • Stroke (cerebrum) ([facility]) 06/01/2015   • Intracranial hemorrhage ([facility]) 11/09/2020   • Stroke-like symptoms 11/09/2020   • Chronic anticoagulation 11/09/2020   • CVA, old, cognitive deficits 11/09/2020   • Alzheimer's disease ([facility]) 11/09/2020     Resolved Ambulatory Problems   Diagnosis Date Noted   • No Resolved Ambulatory Problems     Past Medical History:   Diagnosis Date   • Arthritis   • Atrial fibrillation ([facility])   • Fatigue   • Fracture of distal end of radius   • Osteoarthritis of knee   • Osteoporosis   • Transient blindness   • Transient cerebral ischemia     Past Surgical History:   Procedure Laterality Date   • BACK SURGERY   • CHOLECYSTECTOMY   • PARTIAL HYSTERECTOMY   • WRIST FRACTURE SURGERY   • WRIST FRACTURE SURGERY Left   ORIF distal radius fracture     Family History   Problem Relation Age of Onset   • Stroke Maternal Grandmother     Allergies   Allergen Reactions   • Macrobid [Nitrofurantoin]   Other reaction(s): Vomiting   • Asa [Aspirin] Swelling   Swelling in leg     Social History     Socioeconomic History   • Marital status:    Spouse name: Not on file   • Number of children: Not on file   • Years of education: Not on file   • Highest education level: Not  on file   Occupational History   • Not on file   Social Needs   • Financial resource strain: Not on file   • Food insecurity   Worry: Not on file   Inability: Not on file   • Transportation needs   Medical: Not on file   Non-medical: Not on file   Tobacco Use   • Smoking status: Never Smoker   • Smokeless tobacco: Never Used   Substance and Sexual Activity   • Alcohol use: No   Alcohol/week: 0.0 standard drinks   • Drug use: No   • Sexual activity: Not on file   Lifestyle   • Physical activity   Days per week: Not on file   Minutes per session: Not on file   • Stress: Not on file   Relationships   • Social connections   Talks on phone: Not on file   Gets together: Not on file   Attends [employer] service: Not on file   Active member of club or organization: Not on file   Attends meetings of clubs or organizations: Not on file   Relationship status: Not on file   • Intimate partner violence   Fear of current or ex partner: Not on file   Emotionally abused: Not on file   Physically abused: Not on file   Forced sexual activity: Not on file   Other Topics Concern   • Not on file   Social History Narrative   • Not on file     Review of Systems  Constitutional: ?Fever ?Sweats ?Chills ? Recent Injury ? Denies all unless marked   HENT:?Headache ? Head Injury ? Sore Throat ? Ear Pain ? Dizziness ? Hearing Loss ? Denies all unless marked   Spine: ? Neck pain ? Back pain ? Sciaticia ? Denies all unless marked   Cardiovascular:?Chest Pain ?Palpitations ? Heart Disease ? Denies all unless marked   Pulmonary: ?Shortness of Breath ?Cough ? Denies all unless marked                              NIHSS (NIH Stroke Scale/Score) reviewed and/or performed as part of the patient evaluation and treatment planning process.  The result associated with this review/performance is: 16       MDM    Final diagnoses:   Left middle cerebral artery stroke (HCC)       ED Disposition  ED Disposition     ED Disposition Condition Comment    Transfer to  Another Facility             No follow-up provider specified.       Medication List      No changes were made to your prescriptions during this visit.          Héctor Maharaj MD  10/04/21 0017       Héctor Maharaj MD  10/04/21 0040       Héctor Maharaj MD  10/04/21 0046

## 2021-10-04 NOTE — ED NOTES
Pt has been accepted to [place] ED by Dr. Rachel Noonan per BAR Frias at transfer center.  Nurse can call report to BAR Frias at [phone], option #4.     Geri Huddleston  10/04/21 0037       Geri Huddleston  10/04/21 0107

## 2021-10-04 NOTE — ED NOTES
Weather check made with [facility] but they have declined due to weather.     Geri Huddleston  10/04/21 0107

## 2021-10-04 NOTE — ED NOTES
Pt presents to ED via EMS for CC possible stroke. LKW according to son is 2200. Pt was sitting in chair when son noticed she had brief possible loss of consciousness and when she came back to she was nonverbal and unable to move extremities. Pt has hx of hemorrhagic stroke approx 1 year ago.      Sheyla Lang, RN  10/03/21 [id number]       Sheyla Lang, BAR  10/04/21 2958

## 2021-12-06 DIAGNOSIS — G62.9 NEUROPATHY: Primary | ICD-10-CM

## 2021-12-06 RX ORDER — GABAPENTIN 300 MG/1
CAPSULE ORAL
Qty: 90 CAPSULE | Refills: 5 | Status: SHIPPED | OUTPATIENT
Start: 2021-12-06 | End: 2022-03-02 | Stop reason: SDUPTHER

## 2022-03-02 DIAGNOSIS — G62.9 NEUROPATHY: ICD-10-CM

## 2022-03-02 RX ORDER — GABAPENTIN 300 MG/1
CAPSULE ORAL
Qty: 90 CAPSULE | Refills: 0 | Status: SHIPPED | OUTPATIENT
Start: 2022-03-02 | End: 2022-03-04 | Stop reason: SDUPTHER

## 2022-03-02 NOTE — TELEPHONE ENCOUNTER
Discharged from [facility] on Monday. Will be establishing with you. Has [facility]. When discharged on Monday, she had a UTI and they only had 6 doses of Cipro 500 mg. Will that be sufficient treatment? If not, please advise.

## 2022-03-03 ENCOUNTER — TELEPHONE (OUTPATIENT)
Dept: FAMILY MEDICINE CLINIC | Age: 87
End: 2022-03-03

## 2022-03-03 ENCOUNTER — PATIENT MESSAGE (OUTPATIENT)
Dept: FAMILY MEDICINE CLINIC | Age: 87
End: 2022-03-03

## 2022-03-03 DIAGNOSIS — G30.9 ALZHEIMER'S DISEASE (HCC): ICD-10-CM

## 2022-03-03 DIAGNOSIS — I62.9 INTRACRANIAL HEMORRHAGE (HCC): ICD-10-CM

## 2022-03-03 DIAGNOSIS — I63.9 CEREBROVASCULAR ACCIDENT (CVA), UNSPECIFIED MECHANISM (HCC): Primary | ICD-10-CM

## 2022-03-03 DIAGNOSIS — F02.80 ALZHEIMER'S DISEASE (HCC): ICD-10-CM

## 2022-03-04 ENCOUNTER — PATIENT MESSAGE (OUTPATIENT)
Dept: FAMILY MEDICINE CLINIC | Age: 87
End: 2022-03-04

## 2022-03-04 ENCOUNTER — TELEMEDICINE (OUTPATIENT)
Dept: FAMILY MEDICINE CLINIC | Age: 87
End: 2022-03-04
Payer: MEDICARE

## 2022-03-04 DIAGNOSIS — G25.0 ESSENTIAL TREMOR: ICD-10-CM

## 2022-03-04 DIAGNOSIS — I10 PRIMARY HYPERTENSION: ICD-10-CM

## 2022-03-04 DIAGNOSIS — Z76.89 ENCOUNTER TO ESTABLISH CARE: Primary | ICD-10-CM

## 2022-03-04 DIAGNOSIS — G30.9 ALZHEIMER'S DISEASE (HCC): ICD-10-CM

## 2022-03-04 DIAGNOSIS — F02.80 ALZHEIMER'S DISEASE (HCC): ICD-10-CM

## 2022-03-04 DIAGNOSIS — G25.81 RLS (RESTLESS LEGS SYNDROME): ICD-10-CM

## 2022-03-04 DIAGNOSIS — G62.9 NEUROPATHY: ICD-10-CM

## 2022-03-04 DIAGNOSIS — Z74.09 DECREASED MOBILITY AND ENDURANCE: ICD-10-CM

## 2022-03-04 DIAGNOSIS — I48.91 ATRIAL FIBRILLATION, UNSPECIFIED TYPE (HCC): ICD-10-CM

## 2022-03-04 DIAGNOSIS — K21.9 GASTROESOPHAGEAL REFLUX DISEASE WITHOUT ESOPHAGITIS: ICD-10-CM

## 2022-03-04 DIAGNOSIS — E61.1 IRON DEFICIENCY: ICD-10-CM

## 2022-03-04 DIAGNOSIS — I69.319 CVA, OLD, COGNITIVE DEFICITS: ICD-10-CM

## 2022-03-04 PROCEDURE — G8428 CUR MEDS NOT DOCUMENT: HCPCS | Performed by: FAMILY MEDICINE

## 2022-03-04 PROCEDURE — 99214 OFFICE O/P EST MOD 30 MIN: CPT | Performed by: FAMILY MEDICINE

## 2022-03-04 PROCEDURE — G8484 FLU IMMUNIZE NO ADMIN: HCPCS | Performed by: FAMILY MEDICINE

## 2022-03-04 PROCEDURE — 1036F TOBACCO NON-USER: CPT | Performed by: FAMILY MEDICINE

## 2022-03-04 PROCEDURE — 1123F ACP DISCUSS/DSCN MKR DOCD: CPT | Performed by: FAMILY MEDICINE

## 2022-03-04 PROCEDURE — G8417 CALC BMI ABV UP PARAM F/U: HCPCS | Performed by: FAMILY MEDICINE

## 2022-03-04 PROCEDURE — 4040F PNEUMOC VAC/ADMIN/RCVD: CPT | Performed by: FAMILY MEDICINE

## 2022-03-04 PROCEDURE — 1090F PRES/ABSN URINE INCON ASSESS: CPT | Performed by: FAMILY MEDICINE

## 2022-03-04 RX ORDER — FERROUS SULFATE TAB EC 324 MG (65 MG FE EQUIVALENT) 324 (65 FE) MG
324 TABLET DELAYED RESPONSE ORAL
Qty: 30 TABLET | Refills: 5 | Status: SHIPPED | OUTPATIENT
Start: 2022-03-04 | End: 2022-07-14

## 2022-03-04 RX ORDER — PROPRANOLOL HYDROCHLORIDE 20 MG/1
20 TABLET ORAL 2 TIMES DAILY
Qty: 60 TABLET | Refills: 5 | Status: SHIPPED | OUTPATIENT
Start: 2022-03-04 | End: 2022-03-07 | Stop reason: SDUPTHER

## 2022-03-04 RX ORDER — HYDROCHLOROTHIAZIDE 25 MG/1
25 TABLET ORAL DAILY
Qty: 30 TABLET | Refills: 5 | Status: SHIPPED | OUTPATIENT
Start: 2022-03-04 | End: 2022-07-14

## 2022-03-04 RX ORDER — GABAPENTIN 300 MG/1
CAPSULE ORAL
Qty: 90 CAPSULE | Refills: 5 | Status: SHIPPED | OUTPATIENT
Start: 2022-03-04 | End: 2022-05-06 | Stop reason: SDUPTHER

## 2022-03-04 RX ORDER — POTASSIUM CHLORIDE 20 MEQ/1
20 TABLET, EXTENDED RELEASE ORAL 2 TIMES DAILY
Qty: 60 TABLET | Refills: 5 | Status: SHIPPED | OUTPATIENT
Start: 2022-03-04 | End: 2022-07-14

## 2022-03-04 RX ORDER — CARBIDOPA AND LEVODOPA 25; 100 MG/1; MG/1
1 TABLET, EXTENDED RELEASE ORAL NIGHTLY
Qty: 30 TABLET | Refills: 5 | Status: SHIPPED | OUTPATIENT
Start: 2022-03-04 | End: 2022-07-14

## 2022-03-04 RX ORDER — LANSOPRAZOLE 30 MG/1
30 CAPSULE, DELAYED RELEASE ORAL DAILY
Qty: 30 CAPSULE | Refills: 5 | Status: SHIPPED | OUTPATIENT
Start: 2022-03-04 | End: 2022-07-14

## 2022-03-04 ASSESSMENT — ENCOUNTER SYMPTOMS
ALLERGIC/IMMUNOLOGIC NEGATIVE: 1
RESPIRATORY NEGATIVE: 1
EYES NEGATIVE: 1
GASTROINTESTINAL NEGATIVE: 1

## 2022-03-04 NOTE — PROGRESS NOTES
3/4/2022    TELEHEALTH EVALUATION -- Audio/Visual (During XAYTW-47 public health emergency)    HPI:  Patient here to establish care. Patient is a 55-year-old white female that I saw at the nursing home. She was discharged home with family. She have Alzheimer's dementia and history of strokes. Her mobility is very diminished. She does not walk. They use a lift chair. She transitioned care to home. She has history of A. fib and takes Eliquis  She also takes propranolol. Propanolol also help with essential tremors. Patient son complains of restless leg. She said that she had to move her legs and try to move her leg all night. Moving her leg keeping her awake. She takes Neurontin for neuropathy. Medication is helpful. She also take blood pressure medicine. Blood pressure is well controlled according to the family. She have home health. She is due for blood work. She takes an acid reflux medication iron supplementation therapy need refills on both of them. Melissa Tyson (:  1933) has requested an audio/video evaluation for the following concern(s):    Establish care    Review of Systems   Constitutional: Negative. HENT: Negative. Eyes: Negative. Respiratory: Negative. Cardiovascular: Negative. Gastrointestinal: Negative. Endocrine: Negative. Genitourinary: Negative. Musculoskeletal: Positive for arthralgias and myalgias. Skin: Negative. Allergic/Immunologic: Negative. Neurological: Positive for tremors and weakness. Hematological: Negative. Psychiatric/Behavioral: Positive for decreased concentration and suicidal ideas. Prior to Visit Medications    Medication Sig Taking?  Authorizing Provider   gabapentin (NEURONTIN) 300 MG capsule TAKE ONE CAPSULE BY MOUTH THREE TIMES DAILY Yes Mega Abernathy MD   ferrous sulfate 324 (65 Fe) MG EC tablet Take 1 tablet by mouth daily (with breakfast) Take 1 tablet by mouth daily Yes Mega Abernathy MD   potassium chloride (KLOR-CON M) 20 MEQ extended release tablet Take 1 tablet by mouth 2 times daily Take 20 mEq by mouth 2 times daily Yes Stephanie Garcia MD   lansoprazole (PREVACID) 30 MG delayed release capsule Take 1 capsule by mouth daily Take 30 mg by mouth daily Yes Stephanie Garcia MD   hydroCHLOROthiazide (HYDRODIURIL) 25 MG tablet Take 1 tablet by mouth daily Take 25 mg by mouth daily Yes Stephanie Garcia MD   propranolol (INDERAL) 20 MG tablet Take 1 tablet by mouth 2 times daily   Take 20 mg by mouth 2 times daily Yes Stephanie Garcia MD   apixaban (ELIQUIS) 5 MG TABS tablet Take 1 tablet by mouth 2 times daily Yes Stephanie Garcia MD   carbidopa-levodopa (SINEMET CR)  MG per extended release tablet Take 1 tablet by mouth nightly Yes Stephanie Garcia MD   aspirin EC 81 MG EC tablet Take 81 mg by mouth daily Yes Historical MD Maureen       Social History     Tobacco Use    Smoking status: Never Smoker    Smokeless tobacco: Never Used   Substance Use Topics    Alcohol use: No     Alcohol/week: 0.0 standard drinks    Drug use: No            PHYSICAL EXAMINATION:  [ INSTRUCTIONS:  \"[x]\" Indicates a positive item  \"[]\" Indicates a negative item  -- DELETE ALL ITEMS NOT EXAMINED]  Vital Signs: (As obtained by patient/caregiver or practitioner observation)    Blood pressure-  Heart rate-    Respiratory rate-    Temperature-  Pulse oximetry-   Vital signs not available  Constitutional: [] Appears well-developed and well-nourished [] No apparent distress      [x] Abnormal-chronically debilitated confused female. Mental status  [] Alert and awake  [] Oriented to person/place/time []Able to follow commands    Patient oriented only x1. Does not follow commands. E yes:  EOM    [x]  Normal  [] Abnormal-  Sclera  [x]  Normal  [] Abnormal -         Discharge [x]  None visible  [] Abnormal -    HENT:   [x] Normocephalic, atraumatic.   [] Abnormal   [] Mouth/Throat: Mucous membranes are moist.     External Ears [x] Normal  [] Abnormal-     Neck: [x] No visualized mass     Pulmonary/Chest: [x] Respiratory effort normal.  [] No visualized signs of difficulty breathing or respiratory distress        [] Abnormal-      Musculoskeletal:   [] Normal gait with no signs of ataxia         [] Normal range of motion of neck        [x] Abnormal-generalized weakness      Neurological:        [] No Facial Asymmetry (Cranial nerve 7 motor function) (limited exam to video visit)          [] No gaze palsy        [x] Abnormal-generalized weakness        Skin:        [x] No significant exanthematous lesions or discoloration noted on facial skin         [] Abnormal-            Psychiatric:       [x] Normal Affect [] No Hallucinations        [] Abnormal-     Other pertinent observable physical exam findings-     ASSESSMENT/PLAN:  1. Encounter to establish care  -We will assume care    2. Alzheimer's disease ([facility] [address]  -Continue medical assistance, home health and family close by    3. CVA, old, cognitive deficits  -Continue therap    4. Neuropathy-stable  - gabapentin (NEURONTIN) 300 MG capsule; TAKE ONE CAPSULE BY MOUTH THREE TIMES DAILY  Dispense: 90 capsule; Refill: 5  -Hay  5. Atrial fibrillation, unspecified type (HCC)-stable  - apixaban (ELIQUIS) 5 MG TABS tablet; Take 1 tablet by mouth 2 times daily  Dispense: 60 tablet; Refill: 5    6. Primary hypertension-stable  - potassium chloride (KLOR-CON M) 20 MEQ extended release tablet; Take 1 tablet by mouth 2 times daily Take 20 mEq by mouth 2 times daily  Dispense: 60 tablet; Refill: 5  - hydroCHLOROthiazide (HYDRODIURIL) 25 MG tablet; Take 1 tablet by mouth daily Take 25 mg by mouth daily  Dispense: 30 tablet; Refill: 5  - propranolol (INDERAL) 20 MG tablet; Take 1 tablet by mouth 2 times daily   Take 20 mg by mouth 2 times daily  Dispense: 60 tablet; Refill: 5  - CBC; Future  - Comprehensive Metabolic Panel; Future  - Lipid Panel; Future  - TSH; Future  - Urinalysis; Future    7.  Iron deficiency-ongoing  - ferrous sulfate 324 (65 Fe) MG EC tablet; Take 1 tablet by mouth daily (with breakfast) Take 1 tablet by mouth daily  Dispense: 30 tablet; Refill: 5    8. Gastroesophageal reflux disease without esophagitis-stable  - lansoprazole (PREVACID) 30 MG delayed release capsule; Take 1 capsule by mouth daily Take 30 mg by mouth daily  Dispense: 30 capsule; Refill: 5    9. Essential tremor-stable  - propranolol (INDERAL) 20 MG tablet; Take 1 tablet by mouth 2 times daily   Take 20 mg by mouth 2 times daily  Dispense: 60 tablet; Refill: 5    10. RLS (restless legs syndrome)-new diagnosis  -Trial of carbidopa-levodopa (SINEMET CR)  MG per extended release tablet; Take 1 tablet by mouth nightly  Dispense: 30 tablet; Refill: 5    11. Decreased mobility and endurance  -Continue physical therapy. Return in about 4 weeks (around 4/1/2022). Boris Braden, was evaluated through a synchronous (real-time) audio-video encounter. The patient (or guardian if applicable) is aware that this is a billable service. Verbal consent to proceed has been obtained within the past 12 months. The visit was conducted pursuant to the emergency declaration under the [address] [place] [address], [address] [place] [address] authority and the [employer] and [employer] General Act. Patient identification was verified, and a caregiver was present when appropriate. The patient was located in a state where the provider was credentialed to provide care. Total time spent on this encounter: 39    --Landen Camacho MD on 3/4/2022 at 2:31 PM    An electronic signature was used to authenticate this note.

## 2022-03-07 ENCOUNTER — PATIENT MESSAGE (OUTPATIENT)
Dept: FAMILY MEDICINE CLINIC | Age: 87
End: 2022-03-07

## 2022-03-07 RX ORDER — PROPRANOLOL HYDROCHLORIDE 20 MG/1
20 TABLET ORAL 2 TIMES DAILY
Qty: 60 TABLET | Refills: 5 | Status: SHIPPED | OUTPATIENT
Start: 2022-03-07 | End: 2022-08-11

## 2022-03-07 NOTE — TELEPHONE ENCOUNTER
From: Katherine Wei  To: Dr. Yung Crouch: 3/7/2022 11:46 AM CST  Subject: medicine    I needed a refill on the propranol 20 mg and her melatonin, and her vit D?  I thought he renewed all of them i may have miss understood , any help you can be will be appreciated

## 2022-03-11 ENCOUNTER — ANTI-COAG VISIT (OUTPATIENT)
Dept: FAMILY MEDICINE CLINIC | Age: 87
End: 2022-03-11

## 2022-03-11 RX ORDER — QUETIAPINE FUMARATE 25 MG/1
25 TABLET, FILM COATED ORAL 2 TIMES DAILY
Qty: 60 TABLET | Refills: 3 | Status: SHIPPED | OUTPATIENT
Start: 2022-03-11 | End: 2022-04-07 | Stop reason: ALTCHOICE

## 2022-04-01 NOTE — TELEPHONE ENCOUNTER
From: Diane [place]  To: Dr. Guy Camargo: 3/3/2022 12:49 PM CST  Subject: signed up    [facility],  I done this right , this is a test to see ?  This is Connie Shearer and I have scheduled a visit for [facility] virtual ;-) ThanK YOU for ALL you guys have done !!

## 2022-04-01 NOTE — TELEPHONE ENCOUNTER
*-*-*This message has not been handled. *-*-*    hello! Hope your day is going well so far ;-)   Mom is needing refills on her meds, exspecialy the one for leg cramps at night ,   The one he give her for weeping puts her out like sleeps , I was wondering if there was something else we could try , just asking ? the one he gave was Quetiapine , her sundowners is better with out the pill , I was just looking for a anti depresent you think?  I dont know he is the Dr not me Yue Marc  I was also wondering if i could use a massager on her legs sometimes they  hurt her and if we rub them there better ?  thank you for all you do :-) have a great day :-)   Ill be at work and wont get if u refill them could u send a text @ [phone] , Im very needy tonight, sorry

## 2022-04-05 ENCOUNTER — TELEPHONE (OUTPATIENT)
Dept: FAMILY MEDICINE CLINIC | Age: 87
End: 2022-04-05

## 2022-04-05 DIAGNOSIS — I63.9 CEREBROVASCULAR ACCIDENT (CVA), UNSPECIFIED MECHANISM (HCC): Primary | ICD-10-CM

## 2022-04-05 DIAGNOSIS — I48.91 ATRIAL FIBRILLATION, UNSPECIFIED TYPE (HCC): ICD-10-CM

## 2022-04-05 DIAGNOSIS — I69.319 CVA, OLD, COGNITIVE DEFICITS: ICD-10-CM

## 2022-04-05 NOTE — TELEPHONE ENCOUNTER
Maykel Bailey with [facility] called for the patient.  She is needing a pressure mattress from [facility].

## 2022-04-07 RX ORDER — ZIPRASIDONE HYDROCHLORIDE 20 MG/1
20 CAPSULE ORAL NIGHTLY
Qty: 30 CAPSULE | Refills: 3 | Status: SHIPPED | OUTPATIENT
Start: 2022-04-07 | End: 2022-04-11 | Stop reason: SINTOL

## 2022-04-11 RX ORDER — QUETIAPINE FUMARATE 25 MG/1
12.5 TABLET, FILM COATED ORAL 2 TIMES DAILY
Qty: 30 TABLET | Refills: 5 | Status: SHIPPED | OUTPATIENT
Start: 2022-04-11 | End: 2022-10-07

## 2022-04-11 NOTE — TELEPHONE ENCOUNTER
Euel Paget called stating he gave Dhruv Lara one of the geodon the other night and she has a panic attack. He states he did not give her anymore of that. He went back to the seroquel 25 mg - he gave her half 12.5 mg in the morning and the other half in the afternoon and he thinks that may be working for her. He wanted to make sure that was okay with you before he continues that.

## 2022-04-22 ENCOUNTER — TELEPHONE (OUTPATIENT)
Dept: FAMILY MEDICINE CLINIC | Age: 87
End: 2022-04-22

## 2022-04-22 DIAGNOSIS — N39.0 URINARY TRACT INFECTION WITHOUT HEMATURIA, SITE UNSPECIFIED: Primary | ICD-10-CM

## 2022-04-22 RX ORDER — LEVOFLOXACIN 500 MG/1
500 TABLET, FILM COATED ORAL DAILY
Qty: 7 TABLET | Refills: 0 | Status: SHIPPED | OUTPATIENT
Start: 2022-04-22 | End: 2022-04-29

## 2022-05-06 ENCOUNTER — TELEPHONE (OUTPATIENT)
Dept: FAMILY MEDICINE CLINIC | Age: 87
End: 2022-05-06

## 2022-05-06 DIAGNOSIS — G62.9 NEUROPATHY: ICD-10-CM

## 2022-05-06 RX ORDER — GABAPENTIN 300 MG/1
CAPSULE ORAL
Qty: 120 CAPSULE | Refills: 5 | Status: SHIPPED | OUTPATIENT
Start: 2022-05-06 | End: 2022-05-11

## 2022-05-06 NOTE — TELEPHONE ENCOUNTER
----- Message from Jazz Kim sent at 5/6/2022  7:57 AM CDT -----  Subject: Message to Provider    QUESTIONS  Information for Provider? Pts son called in stating that pt is having   cramps in both legs, he has put muscle cream on them, but it isnt helping. Pts son would like to know if there is anything else that he can give her   or if there is anything else her pcp can recommend to help   ---------------------------------------------------------------------------  --------------  [address]  What is the best way for the office to contact you? OK to leave message on   voicemail  Preferred Call Back Phone Number? [phone]  ---------------------------------------------------------------------------  --------------  SCRIPT ANSWERS  Relationship to Patient? Other  Representative Name? larissa [email or web address]  Is the Representative on the appropriate HIPAA document in Epic?  Yes

## 2022-05-06 NOTE — TELEPHONE ENCOUNTER
Yes. He has been rubbing her legs with Biofreeze he just needs some guidance. Dr. Noemi Martin spoke with Rosangela Craig himself. Increase gabapentin to 300mg am, 300 mg noon, 600 mg pm. Son is aware.  Rx needs to be sent

## 2022-05-11 RX ORDER — GABAPENTIN 300 MG/1
CAPSULE ORAL
Qty: 120 CAPSULE | Refills: 5 | Status: SHIPPED | OUTPATIENT
Start: 2022-05-11 | End: 2022-05-25 | Stop reason: SDUPTHER

## 2022-05-24 NOTE — TELEPHONE ENCOUNTER
"warfarin (JANTOVEN) 5 MG tablet      Last Written Prescription Date:  10/16/18  Last Fill Quantity: 85,   # refills: 0  Last Office Visit: 7/31/18  Future Office visit:       Requested Prescriptions   Pending Prescriptions Disp Refills     warfarin (JANTOVEN) 5 MG tablet 85 tablet 0     Sig: Take 2.5 mg on Mon, Fri; 5 mg all other days or As directed by Anticoagulation clinic    Vitamin K Antagonists Failed - 1/8/2019  3:27 PM       Failed - INR is within goal in the past 6 weeks    Confirm INR is within goal in the past 6 weeks.     Recent Labs   Lab Test 01/03/19   INR 2.6*                      Passed - Recent (12 mo) or future (30 days) visit within the authorizing provider's specialty    Patient had office visit in the last 12 months or has a visit in the next 30 days with authorizing provider or within the authorizing provider's specialty.  See \"Patient Info\" tab in inbasket, or \"Choose Columns\" in Meds & Orders section of the refill encounter.             Passed - Medication is active on med list       Passed - Patient is 18 years of age or older          " Just checking in with you guys. The 2 Gabapentin in  the morning and one at noon and 2 in the evenings has really calmed the legs pain ! Thank God now she has some problems but we work it and rub it some and it  passes , Thank you,   Her BM's has a green tint to it ,  her day nurse was concerned with it , it don't look all that green to me but tonight it does have a green tint to it, I was wondering if its some of the medicine she's on or ?  I did give her a laxative this morning, Im not feeding her anything different than what we have been ,  I did give her  a small salad Sunday with her meal, I just wanted to check, She is having no throwing up or stomach cramps ,   Thank you for all you guys do :-)

## 2022-05-25 RX ORDER — GABAPENTIN 300 MG/1
CAPSULE ORAL
Qty: 150 CAPSULE | Refills: 5 | Status: SHIPPED | OUTPATIENT
Start: 2022-05-25 | End: 2022-06-25

## 2022-07-14 DIAGNOSIS — I48.91 ATRIAL FIBRILLATION, UNSPECIFIED TYPE (HCC): ICD-10-CM

## 2022-07-14 DIAGNOSIS — K21.9 GASTROESOPHAGEAL REFLUX DISEASE WITHOUT ESOPHAGITIS: ICD-10-CM

## 2022-07-14 DIAGNOSIS — G25.81 RLS (RESTLESS LEGS SYNDROME): ICD-10-CM

## 2022-07-14 DIAGNOSIS — E61.1 IRON DEFICIENCY: ICD-10-CM

## 2022-07-14 DIAGNOSIS — I10 PRIMARY HYPERTENSION: ICD-10-CM

## 2022-07-14 RX ORDER — APIXABAN 5 MG/1
TABLET, FILM COATED ORAL
Qty: 60 TABLET | Refills: 5 | Status: SHIPPED | OUTPATIENT
Start: 2022-07-14

## 2022-07-14 RX ORDER — FERROUS SULFATE TAB EC 324 MG (65 MG FE EQUIVALENT) 324 (65 FE) MG
TABLET DELAYED RESPONSE ORAL
Qty: 30 TABLET | Refills: 5 | Status: SHIPPED | OUTPATIENT
Start: 2022-07-14

## 2022-07-14 RX ORDER — LANSOPRAZOLE 30 MG/1
CAPSULE, DELAYED RELEASE ORAL
Qty: 30 CAPSULE | Refills: 5 | Status: SHIPPED | OUTPATIENT
Start: 2022-07-14

## 2022-07-14 RX ORDER — HYDROCHLOROTHIAZIDE 25 MG/1
TABLET ORAL
Qty: 30 TABLET | Refills: 5 | Status: SHIPPED | OUTPATIENT
Start: 2022-07-14

## 2022-07-14 RX ORDER — POTASSIUM CHLORIDE 20 MEQ/1
TABLET, EXTENDED RELEASE ORAL
Qty: 60 TABLET | Refills: 5 | Status: SHIPPED | OUTPATIENT
Start: 2022-07-14

## 2022-07-14 RX ORDER — CARBIDOPA AND LEVODOPA 25; 100 MG/1; MG/1
TABLET, EXTENDED RELEASE ORAL
Qty: 30 TABLET | Refills: 5 | Status: SHIPPED | OUTPATIENT
Start: 2022-07-14

## 2022-07-20 ENCOUNTER — TELEMEDICINE (OUTPATIENT)
Dept: FAMILY MEDICINE CLINIC | Age: 87
End: 2022-07-20
Payer: MEDICARE

## 2022-07-20 DIAGNOSIS — I10 PRIMARY HYPERTENSION: ICD-10-CM

## 2022-07-20 DIAGNOSIS — I63.9 CEREBROVASCULAR ACCIDENT (CVA), UNSPECIFIED MECHANISM (HCC): ICD-10-CM

## 2022-07-20 DIAGNOSIS — G25.81 RLS (RESTLESS LEGS SYNDROME): ICD-10-CM

## 2022-07-20 DIAGNOSIS — G30.9 ALZHEIMER'S DISEASE (HCC): Primary | ICD-10-CM

## 2022-07-20 DIAGNOSIS — F02.80 ALZHEIMER'S DISEASE (HCC): Primary | ICD-10-CM

## 2022-07-20 PROBLEM — R56.9 SEIZURE (HCC): Status: ACTIVE | Noted: 2022-07-20

## 2022-07-20 PROBLEM — R56.9 SEIZURE (HCC): Status: RESOLVED | Noted: 2022-07-20 | Resolved: 2022-07-20

## 2022-07-20 PROCEDURE — 1123F ACP DISCUSS/DSCN MKR DOCD: CPT | Performed by: FAMILY MEDICINE

## 2022-07-20 PROCEDURE — 99214 OFFICE O/P EST MOD 30 MIN: CPT | Performed by: FAMILY MEDICINE

## 2022-07-20 ASSESSMENT — ENCOUNTER SYMPTOMS
ALLERGIC/IMMUNOLOGIC NEGATIVE: 1
GASTROINTESTINAL NEGATIVE: 1
EYES NEGATIVE: 1
RESPIRATORY NEGATIVE: 1

## 2022-07-20 NOTE — PROGRESS NOTES
2022    TELEHEALTH EVALUATION -- Audio/Visual (During PSDSW-86 public health emergency)    HPI:  Patient is an 45-year-old white female with Alzheimer's dementia. She lives at home with family and sitters. No falls since last time I saw her. She have history of atrial fibrillation take blood pressure medication anticoagulation therapy. She also have history of strokes. She has been doing well. She is eating good. She has some constipation but respond to laxative. Last time I saw her she was having issues with restless leg syndrome. She respond to Sinemet. (:  1933) has requested an audio/video evaluation for the following concern(s):    Patient here for follow-up of multiple medical problems. She have mobility issues so is difficult for her to come to the office. Review of Systems   Constitutional: Negative. HENT: Negative. Eyes: Negative. Respiratory: Negative. Cardiovascular: Negative. Gastrointestinal: Negative. Endocrine: Negative. Genitourinary: Negative. Musculoskeletal: Negative. Skin: Negative. Allergic/Immunologic: Negative. Neurological: Negative. Hematological: Negative. Psychiatric/Behavioral: Negative. Prior to Visit Medications    Medication Sig Taking?  Authorizing Provider   potassium chloride (KLOR-CON M) 20 MEQ extended release tablet TAKE ONE TABLET BY MOUTH TWICE DAILY  Reagan Iraheta MD   carbidopa-levodopa (SINEMET CR)  MG per extended release tablet TAKE ONE TABLET BY MOUTH NIGHTLY  Reagan Iraheta MD   ELIQUIS 5 MG TABS tablet TAKE ONE TABLET BY MOUTH TWICE DAILY  Reagan Iraheta MD   lansoprazole (PREVACID) 30 MG delayed release capsule TAKE ONE CAPSULE BY MOUTH EVERY DAY  Reagan Iraheta MD   ferrous sulfate 324 (65 Fe) MG EC tablet TAKE ONE TABLET BY MOUTH DAILY WITH BREAKFAST  Reagan Iraheta MD   hydroCHLOROthiazide (HYDRODIURIL) 25 MG tablet TAKE ONE TABLET BY MOUTH EVERY DAY  Reagan Iraheta MD   gabapentin (NEURONTIN) 300 MG capsule 2 po in AM, 1 po at noon and 2 po in PM  Candi Douglas MD   QUEtiapine (SEROQUEL) 25 MG tablet Take 0.5 tablets by mouth 2 times daily  Candi Douglas MD   propranolol (INDERAL) 20 MG tablet Take 1 tablet by mouth 2 times daily  Candi Douglas MD   aspirin EC 81 MG EC tablet Take 81 mg by mouth daily  Historical Provider, MD       Social History     Tobacco Use    Smoking status: Never    Smokeless tobacco: Never   Substance Use Topics    Alcohol use: No     Alcohol/week: 0.0 standard drinks    Drug use: No            PHYSICAL EXAMINATION:  [ INSTRUCTIONS:  \"[x]\" Indicates a positive item  \"[]\" Indicates a negative item  -- DELETE ALL ITEMS NOT EXAMINED]  Vital Signs: (As obtained by patient/caregiver or practitioner observation)  Vital signs unable to obtain  Blood pressure-  Heart rate-    Respiratory rate-    Temperature-  Pulse oximetry-     Constitutional: [x] Appears well-developed and well-nourished [] No apparent distress      [] Abnormal-   Mental status  [x] Alert and awake  [x] Oriented to person/place/time [x]Able to follow commands      Eyes:  EOM    [x]  Normal  [] Abnormal-  Sclera  [x]  Normal  [] Abnormal -         Discharge [x]  None visible  [] Abnormal -    HENT:   [x] Normocephalic, atraumatic.   [] Abnormal   [] Mouth/Throat: Mucous membranes are moist.     External Ears [x] Normal  [] Abnormal-     Neck: [x] No visualized mass     Pulmonary/Chest: [x] Respiratory effort normal.  [] No visualized signs of difficulty breathing or respiratory distress        [] Abnormal-      Musculoskeletal:   [x] Normal gait with no signs of ataxia         [] Normal range of motion of neck        [] Abnormal-       Neurological:        [x] No Facial Asymmetry (Cranial nerve 7 motor function) (limited exam to video visit)          [] No gaze palsy        [] Abnormal-         Skin:        [x] No significant exanthematous lesions or discoloration noted on facial skin         [] Abnormal-            Psychiatric: [x] Normal Affect [] No Hallucinations        [] Abnormal-     Other pertinent observable physical exam findings-     ASSESSMENT/PLAN:  1. Alzheimer's disease (HCC)-ongoing  -Continue 24-hour sitters at home    2. Primary hypertension-stable  -Continue medication    3. Cerebrovascular accident (CVA), unspecified mechanism (HCC)-stable  -Continue medication    4. RLS (restless legs syndrome)-continue medication      Return in about 6 months (around 1/20/2023). Emily Moreno, was evaluated through a synchronous (real-time) audio-video encounter. The patient (or guardian if applicable) is aware that this is a billable service. Verbal consent to proceed has been obtained within the past 12 months. The visit was conducted pursuant to the emergency declaration under the [address] [place] [address], [address] [place] [address] authority and the [employer] General Act. Patient identification was verified, and a caregiver was present when appropriate. The patient was located in a state where the provider was credentialed to provide care. Total time spent on this encounter: Not billed by time    --Opal Mcintosh MD on 7/20/2022 at 3:48 PM    An electronic signature was used to authenticate this note.

## 2022-08-11 DIAGNOSIS — I10 PRIMARY HYPERTENSION: ICD-10-CM

## 2022-08-11 DIAGNOSIS — G25.0 ESSENTIAL TREMOR: ICD-10-CM

## 2022-08-11 RX ORDER — PROPRANOLOL HYDROCHLORIDE 20 MG/1
TABLET ORAL
Qty: 60 TABLET | Refills: 5 | Status: SHIPPED | OUTPATIENT
Start: 2022-08-11

## 2022-10-07 RX ORDER — QUETIAPINE FUMARATE 25 MG/1
TABLET, FILM COATED ORAL
Qty: 30 TABLET | Refills: 0 | Status: SHIPPED | OUTPATIENT
Start: 2022-10-07 | End: 2022-11-30

## 2022-11-16 DIAGNOSIS — G62.9 NEUROPATHY: ICD-10-CM

## 2022-11-16 RX ORDER — GABAPENTIN 300 MG/1
CAPSULE ORAL
Qty: 150 CAPSULE | Refills: 5 | Status: SHIPPED | OUTPATIENT
Start: 2022-11-16 | End: 2022-12-16

## 2022-11-30 DIAGNOSIS — E61.1 IRON DEFICIENCY: ICD-10-CM

## 2022-11-30 RX ORDER — QUETIAPINE FUMARATE 25 MG/1
TABLET, FILM COATED ORAL
Qty: 30 TABLET | Refills: 0 | Status: SHIPPED | OUTPATIENT
Start: 2022-11-30

## 2022-11-30 RX ORDER — FERROUS SULFATE TAB EC 324 MG (65 MG FE EQUIVALENT) 324 (65 FE) MG
TABLET DELAYED RESPONSE ORAL
Qty: 30 TABLET | Refills: 5 | Status: SHIPPED | OUTPATIENT
Start: 2022-11-30

## 2022-12-27 RX ORDER — QUETIAPINE FUMARATE 25 MG/1
TABLET, FILM COATED ORAL
Qty: 30 TABLET | Refills: 5 | Status: SHIPPED | OUTPATIENT
Start: 2022-12-27

## 2022-12-28 DIAGNOSIS — I10 PRIMARY HYPERTENSION: ICD-10-CM

## 2022-12-28 DIAGNOSIS — G25.81 RLS (RESTLESS LEGS SYNDROME): ICD-10-CM

## 2022-12-28 DIAGNOSIS — K21.9 GASTROESOPHAGEAL REFLUX DISEASE WITHOUT ESOPHAGITIS: ICD-10-CM

## 2022-12-28 DIAGNOSIS — I48.91 ATRIAL FIBRILLATION, UNSPECIFIED TYPE (HCC): ICD-10-CM

## 2022-12-28 RX ORDER — POTASSIUM CHLORIDE 20 MEQ/1
TABLET, EXTENDED RELEASE ORAL
Qty: 60 TABLET | Refills: 5 | Status: SHIPPED | OUTPATIENT
Start: 2022-12-28

## 2022-12-28 RX ORDER — APIXABAN 5 MG/1
TABLET, FILM COATED ORAL
Qty: 60 TABLET | Refills: 5 | Status: SHIPPED | OUTPATIENT
Start: 2022-12-28

## 2022-12-28 RX ORDER — CARBIDOPA AND LEVODOPA 25; 100 MG/1; MG/1
TABLET, EXTENDED RELEASE ORAL
Qty: 30 TABLET | Refills: 5 | Status: SHIPPED | OUTPATIENT
Start: 2022-12-28

## 2022-12-28 RX ORDER — HYDROCHLOROTHIAZIDE 25 MG/1
TABLET ORAL
Qty: 30 TABLET | Refills: 5 | Status: SHIPPED | OUTPATIENT
Start: 2022-12-28

## 2022-12-28 RX ORDER — LANSOPRAZOLE 30 MG/1
CAPSULE, DELAYED RELEASE ORAL
Qty: 30 CAPSULE | Refills: 5 | Status: SHIPPED | OUTPATIENT
Start: 2022-12-28

## 2023-01-12 RX ORDER — CEFDINIR 250 MG/5ML
300 POWDER, FOR SUSPENSION ORAL 2 TIMES DAILY
Qty: 60 ML | Refills: 0 | Status: SHIPPED | OUTPATIENT
Start: 2023-01-12 | End: 2023-01-17

## 2023-01-13 LAB
ANTIBIOTICSUSCEPTMIC: ABNORMAL
BACTERIA: ABNORMAL
BILIRUBIN URINE: NEGATIVE
BLOOD, URINE: ABNORMAL
CASTS: ABNORMAL
CASTS: PRESENT /LPF
CLARITY: ABNORMAL
COLOR: YELLOW
CRYSTALS, UA: ABNORMAL
CRYSTALS: PRESENT
CULTURE: ABNORMAL
CULTURE: ABNORMAL
EPITHELIAL CELLS, UA: >10 /HPF (ref 0–10)
GLUCOSE URINE: NEGATIVE
KETONES, URINE: NEGATIVE
LEUKOCYTE ESTERASE, URINE: ABNORMAL
LEUKOCYTES, UA: >30 /HPF (ref 0–5)
MICROSCOPIC EXAMINATION: ABNORMAL
MICROSCOPIC EXAMINATION: ABNORMAL
MUCUS: PRESENT
NITRITE, URINE: POSITIVE
PH UA: 7.5 (ref 5–7.5)
PROTEIN UA: ABNORMAL
RBC UA: ABNORMAL /HPF (ref 0–2)
RENAL EPITHELIAL, POC: ABNORMAL
SPECIFIC GRAVITY UA: 1.01 (ref 1–1.03)
TRICHOMONAS VAG: ABNORMAL
URINE SEDIMENT COMMENTS: ABNORMAL
UROBILINOGEN, URINE: 0.2 MG/DL (ref 0.2–1)
YEAST: ABNORMAL

## 2023-01-23 DIAGNOSIS — G25.0 ESSENTIAL TREMOR: ICD-10-CM

## 2023-01-23 DIAGNOSIS — I10 PRIMARY HYPERTENSION: ICD-10-CM

## 2023-01-23 RX ORDER — PROPRANOLOL HYDROCHLORIDE 20 MG/1
TABLET ORAL
Qty: 60 TABLET | Refills: 5 | Status: SHIPPED | OUTPATIENT
Start: 2023-01-23

## 2023-03-29 ENCOUNTER — PATIENT MESSAGE (OUTPATIENT)
Dept: FAMILY MEDICINE CLINIC | Age: 88
End: 2023-03-29

## 2023-03-30 RX ORDER — DOXYCYCLINE HYCLATE 100 MG
100 TABLET ORAL 2 TIMES DAILY
Qty: 10 TABLET | Refills: 0 | Status: SHIPPED | OUTPATIENT
Start: 2023-03-30 | End: 2023-04-06

## 2023-03-30 NOTE — TELEPHONE ENCOUNTER
From: Ioana Davis  To: Dr. Mono Saucedo  Sent: 3/29/2023 10:24 PM CDT  Subject: cough    I was wondering if you could send my mom Ioana Davis a antibiotic to kick this cough and flem out of her . I have been treating it for a week with over the counter , which has helped but not cleared it completely. Her Oz level is at 94 and pulse 84 , her BP is at 123/80 , Shes not running a fever its just a lot of coughing and white flem . Thank you in advance for your help .  My phone number is  if you have any questions , We use  pharmacy in [place] .

## 2023-04-06 ENCOUNTER — TELEPHONE (OUTPATIENT)
Dept: FAMILY MEDICINE CLINIC | Age: 88
End: 2023-04-06

## 2023-04-06 DIAGNOSIS — I69.319 CVA, OLD, COGNITIVE DEFICITS: ICD-10-CM

## 2023-04-06 DIAGNOSIS — I48.91 ATRIAL FIBRILLATION, UNSPECIFIED TYPE (HCC): ICD-10-CM

## 2023-04-06 DIAGNOSIS — G30.9 ALZHEIMER'S DISEASE (HCC): Primary | ICD-10-CM

## 2023-04-06 DIAGNOSIS — I63.9 CEREBROVASCULAR ACCIDENT (CVA), UNSPECIFIED MECHANISM (HCC): ICD-10-CM

## 2023-04-06 DIAGNOSIS — I62.9 INTRACRANIAL HEMORRHAGE (HCC): ICD-10-CM

## 2023-04-06 DIAGNOSIS — G25.0 ESSENTIAL TREMOR: ICD-10-CM

## 2023-04-06 DIAGNOSIS — F02.80 ALZHEIMER'S DISEASE (HCC): Primary | ICD-10-CM

## 2023-04-06 NOTE — TELEPHONE ENCOUNTER
Sumeet Gomez called stating he has noticed a drastic decline in his mom the past couple of weeks. She is no longer eating and he has to try to feed her to get her to eat. Will order a Hospice referral for them to go out an evaluate her for the need for Hospice.

## 2023-04-24 LAB
ANTIBIOTICSUSCEPTMIC: ABNORMAL
BACTERIA: ABNORMAL
BILIRUBIN URINE: NEGATIVE
BLOOD, URINE: ABNORMAL
CASTS: ABNORMAL
CASTS: ABNORMAL /LPF
CLARITY: ABNORMAL
COLOR: YELLOW
CRYSTALS, UA: ABNORMAL
CRYSTALS: PRESENT
CULTURE: ABNORMAL
CULTURE: ABNORMAL
EPITHELIAL CELLS, UA: >10 /HPF (ref 0–10)
GLUCOSE URINE: NEGATIVE
KETONES, URINE: NEGATIVE
LEUKOCYTE ESTERASE, URINE: ABNORMAL
LEUKOCYTES, UA: ABNORMAL /HPF (ref 0–5)
MICROSCOPIC EXAMINATION: ABNORMAL
MICROSCOPIC EXAMINATION: ABNORMAL
MUCUS: PRESENT
NITRITE, URINE: NEGATIVE
PH UA: >=9 (ref 5–7.5)
PROTEIN UA: ABNORMAL
RBC UA: ABNORMAL /HPF (ref 0–2)
RENAL EPITHELIAL, POC: ABNORMAL
SPECIFIC GRAVITY UA: 1.01 (ref 1–1.03)
TRICHOMONAS VAG: ABNORMAL
URINALYSIS REFLEX: ABNORMAL
URINE SEDIMENT COMMENTS: ABNORMAL
UROBILINOGEN, URINE: 0.2 MG/DL (ref 0.2–1)
YEAST: ABNORMAL